# Patient Record
Sex: MALE | Race: WHITE | NOT HISPANIC OR LATINO | Employment: OTHER | URBAN - METROPOLITAN AREA
[De-identification: names, ages, dates, MRNs, and addresses within clinical notes are randomized per-mention and may not be internally consistent; named-entity substitution may affect disease eponyms.]

---

## 2017-01-30 DIAGNOSIS — E78.5 DYSLIPIDEMIA: ICD-10-CM

## 2017-01-31 RX ORDER — ATORVASTATIN CALCIUM 40 MG/1
TABLET, FILM COATED ORAL
Qty: 90 TABLET | Refills: 2 | Status: SHIPPED | OUTPATIENT
Start: 2017-01-31 | End: 2017-10-31 | Stop reason: SDUPTHER

## 2017-06-26 ENCOUNTER — OFFICE VISIT (OUTPATIENT)
Dept: FAMILY MEDICINE | Facility: CLINIC | Age: 52
End: 2017-06-26
Payer: COMMERCIAL

## 2017-06-26 VITALS
BODY MASS INDEX: 31.03 KG/M2 | HEIGHT: 71 IN | SYSTOLIC BLOOD PRESSURE: 120 MMHG | HEART RATE: 72 BPM | WEIGHT: 221.63 LBS | DIASTOLIC BLOOD PRESSURE: 72 MMHG | RESPIRATION RATE: 16 BRPM

## 2017-06-26 DIAGNOSIS — E78.5 DYSLIPIDEMIA: Primary | ICD-10-CM

## 2017-06-26 DIAGNOSIS — R00.2 HEART PALPITATIONS: ICD-10-CM

## 2017-06-26 PROCEDURE — 99213 OFFICE O/P EST LOW 20 MIN: CPT | Mod: S$GLB,,, | Performed by: FAMILY MEDICINE

## 2017-06-26 PROCEDURE — 99999 PR PBB SHADOW E&M-EST. PATIENT-LVL II: CPT | Mod: PBBFAC,,, | Performed by: FAMILY MEDICINE

## 2017-06-26 NOTE — PROGRESS NOTES
Subjective:       Patient ID: Daniel Vogel Jr. is a 52 y.o. male.    Chief Complaint: Follow-up (heart palpation)    Patient was sent to the emergency room from work because he is having a sensation of palpitations.  EKG complete lab work was normal.  He takes Lipitor and Fish all for mild hyperlipidemia.  He tolerates this well.  He sees cardiology on a regular basis.  3 years ago his calcium score was 0.  He's had a stress test done which was normal.  They did a Holter monitor on him and it came back normal.  He feels well and has not had any symptoms since going to the emergency room.      Review of Systems   Constitutional: Negative for activity change, chills, fatigue, fever and unexpected weight change.   HENT: Negative for hearing loss, rhinorrhea, sore throat and trouble swallowing.    Eyes: Positive for visual disturbance. Negative for discharge.   Respiratory: Positive for chest tightness. Negative for cough, shortness of breath and wheezing.    Cardiovascular: Negative for chest pain, palpitations and leg swelling.   Gastrointestinal: Negative for abdominal pain, blood in stool, constipation, diarrhea and vomiting.   Endocrine: Negative for cold intolerance, heat intolerance, polydipsia and polyuria.   Genitourinary: Negative for difficulty urinating, hematuria and urgency.   Musculoskeletal: Positive for arthralgias. Negative for back pain, joint swelling and neck pain.   Skin: Negative for rash.   Neurological: Negative for weakness, numbness and headaches.   Hematological: Negative for adenopathy.   Psychiatric/Behavioral: Negative for confusion, decreased concentration and dysphoric mood.       Objective:      Vitals:    06/26/17 1554   BP: 120/72   Pulse: 72   Resp: 16     Physical Exam   Constitutional: He appears well-developed and well-nourished.   HENT:   Head: Normocephalic and atraumatic.   Eyes: EOM are normal. Pupils are equal, round, and reactive to light.   Neck: Neck supple. No JVD  present. No thyromegaly present.   Cardiovascular: Normal rate, regular rhythm, normal heart sounds and intact distal pulses.  Exam reveals no gallop and no friction rub.    No murmur heard.  Pulmonary/Chest: Effort normal and breath sounds normal. He exhibits no tenderness.   Musculoskeletal: He exhibits tenderness. He exhibits no edema.   Mild shoulder pain   Neurological: He is alert. He has normal reflexes.   Psychiatric: He has a normal mood and affect. His behavior is normal. Judgment and thought content normal.       Assessment:       1. Dyslipidemia    2. Heart palpitations        Plan:   Daniel was seen today for follow-up.    Diagnoses and all orders for this visit:    Dyslipidemia  Continue taking fish oil and Lipitor    Heart palpitations  Doubt patient has any cardiac disease.  I think it is safe for him to return to work.  Keep appointment with cardiology  Return to clinic as needed

## 2017-10-31 DIAGNOSIS — E78.5 DYSLIPIDEMIA: ICD-10-CM

## 2017-10-31 RX ORDER — ATORVASTATIN CALCIUM 40 MG/1
TABLET, FILM COATED ORAL
Qty: 90 TABLET | Refills: 2 | Status: SHIPPED | OUTPATIENT
Start: 2017-10-31 | End: 2018-07-28 | Stop reason: SDUPTHER

## 2017-12-18 ENCOUNTER — OFFICE VISIT (OUTPATIENT)
Dept: URGENT CARE | Facility: CLINIC | Age: 52
End: 2017-12-18
Payer: COMMERCIAL

## 2017-12-18 VITALS
BODY MASS INDEX: 30.1 KG/M2 | HEIGHT: 71 IN | TEMPERATURE: 98 F | HEART RATE: 67 BPM | SYSTOLIC BLOOD PRESSURE: 134 MMHG | WEIGHT: 215 LBS | OXYGEN SATURATION: 98 % | DIASTOLIC BLOOD PRESSURE: 90 MMHG | RESPIRATION RATE: 16 BRPM

## 2017-12-18 DIAGNOSIS — M62.838 TRAPEZIUS MUSCLE SPASM: Primary | ICD-10-CM

## 2017-12-18 PROCEDURE — 99203 OFFICE O/P NEW LOW 30 MIN: CPT | Mod: S$GLB,,, | Performed by: PHYSICIAN ASSISTANT

## 2017-12-18 RX ORDER — METHOCARBAMOL 500 MG/1
500 TABLET, FILM COATED ORAL 4 TIMES DAILY
Qty: 30 TABLET | Refills: 0 | Status: SHIPPED | OUTPATIENT
Start: 2017-12-18 | End: 2017-12-28

## 2017-12-18 RX ORDER — FLUTICASONE PROPIONATE 50 MCG
SPRAY, SUSPENSION (ML) NASAL
COMMUNITY
Start: 2017-10-31 | End: 2021-04-15

## 2017-12-18 NOTE — LETTER
December 18, 2017      [facility]  [address] [address], [address]  [place] [address]  Phone: [phone]  Fax: [phone]       Patient: Daniel Vogel   YOB: 1965  Date of Visit: 12/18/2017    To Whom It May Concern:    Leroy Vogel  was at [facility] on 12/18/2017. He may return to work/school on 12/20/2017 with restrictions. Must refrain from lifting over 30 pounds for 1 week. May perform all other activities. If you have any questions or concerns, or if I can be of further assistance, please do not hesitate to contact me.    Sincerely,    Wendie Manuel PA-C

## 2017-12-18 NOTE — PROGRESS NOTES
"Subjective:       Patient ID: Daniel Vogel Jr. is a 52 y.o. male.    Vitals:  height is 5' 11" (1.803 m) and weight is 97.5 kg (215 lb). His oral temperature is 97.9 °F (36.6 °C). His blood pressure is 134/90 (abnormal) and his pulse is 67. His respiration is 16 and oxygen saturation is 98%.     Chief Complaint: Neck Pain    Patient states he started having pain after pulling the string to start a generator during the snow day.      Neck Pain    This is a new problem. The current episode started 1 to 4 weeks ago. The problem occurs constantly. The problem has been gradually worsening. The pain is associated with nothing. The pain is present in the midline. The quality of the pain is described as aching and shooting. The pain is at a severity of 8/10. The pain is moderate. The symptoms are aggravated by position. The pain is same all the time. Stiffness is present all day. Pertinent negatives include no chest pain, fever, headaches, leg pain, numbness, pain with swallowing, paresis, photophobia, syncope, tingling, trouble swallowing, visual change or weakness. He has tried NSAIDs for the symptoms. The treatment provided mild relief.     Review of Systems   Constitution: Negative for chills, fever and weakness.   HENT: Negative for sore throat and trouble swallowing.    Eyes: Negative for blurred vision and photophobia.   Cardiovascular: Negative for chest pain and syncope.   Respiratory: Negative for shortness of breath.    Skin: Negative for rash.   Musculoskeletal: Positive for joint pain and neck pain. Negative for back pain.   Gastrointestinal: Negative for abdominal pain, diarrhea, nausea and vomiting.   Neurological: Negative for headaches, numbness and tingling.   Psychiatric/Behavioral: The patient is not nervous/anxious.        Objective:      Physical Exam   Constitutional: He is oriented to person, place, and time. He appears well-developed and well-nourished. He is cooperative.  Non-toxic appearance. " He does not appear ill. No distress.   HENT:   Head: Normocephalic and atraumatic.   Right Ear: Hearing, tympanic membrane, external ear and ear canal normal.   Left Ear: Hearing, tympanic membrane, external ear and ear canal normal.   Nose: Nose normal. No mucosal edema, rhinorrhea or nasal deformity. No epistaxis. Right sinus exhibits no maxillary sinus tenderness and no frontal sinus tenderness. Left sinus exhibits no maxillary sinus tenderness and no frontal sinus tenderness.   Mouth/Throat: Uvula is midline, oropharynx is clear and moist and mucous membranes are normal. No trismus in the jaw. Normal dentition. No uvula swelling. No posterior oropharyngeal erythema.   Eyes: Conjunctivae and lids are normal. Right eye exhibits no discharge. Left eye exhibits no discharge. No scleral icterus.   Sclera clear bilat   Neck: Trachea normal, full passive range of motion without pain and phonation normal. Muscular tenderness present. No spinous process tenderness present. Neck rigidity present. No edema, no erythema and normal range of motion present. No Brudzinski's sign and no Kernig's sign noted.       No decreased ROM although mild discomfort elicited with Lateral flexion to the Left and flexion of the neck   Cardiovascular: Normal rate, regular rhythm, normal heart sounds, intact distal pulses and normal pulses.    Pulmonary/Chest: Effort normal and breath sounds normal. No respiratory distress.   Abdominal: Soft. Normal appearance and bowel sounds are normal. He exhibits no distension, no pulsatile midline mass and no mass. There is no tenderness.   Musculoskeletal: He exhibits no edema or deformity.        Right shoulder: He exhibits tenderness and spasm. He exhibits normal range of motion, no bony tenderness, no swelling, no effusion, no crepitus, no deformity, no laceration, normal pulse and normal strength.        Left shoulder: He exhibits normal range of motion, no tenderness, no bony tenderness, no  swelling, no effusion, no crepitus, no deformity, no laceration, no pain, no spasm, normal pulse and normal strength.        Right elbow: He exhibits normal range of motion, no swelling, no effusion, no deformity and no laceration. No tenderness found.        Right wrist: He exhibits normal range of motion, no tenderness, no bony tenderness, no swelling, no effusion, no crepitus, no deformity and no laceration.        Cervical back: He exhibits tenderness and spasm. He exhibits normal range of motion, no bony tenderness, no swelling, no edema, no deformity, no laceration and normal pulse.        Thoracic back: He exhibits normal range of motion, no tenderness, no bony tenderness, no swelling, no edema, no deformity, no laceration, no pain, no spasm and normal pulse.        Right upper arm: He exhibits no tenderness, no bony tenderness, no swelling, no edema, no deformity and no laceration.        Right forearm: He exhibits no tenderness, no bony tenderness, no swelling, no edema, no deformity and no laceration.        Arms:       Right hand: He exhibits normal range of motion, no tenderness, no bony tenderness, normal two-point discrimination, normal capillary refill, no deformity, no laceration and no swelling. Normal sensation noted. Normal strength noted.   R trapezius muscle spasm and TTP   Neurological: He is alert and oriented to person, place, and time. He has normal strength. No cranial nerve deficit or sensory deficit. He exhibits normal muscle tone. Coordination normal.   Skin: Skin is warm, dry and intact. He is not diaphoretic. No pallor.   Psychiatric: He has a normal mood and affect. His speech is normal and behavior is normal. Judgment and thought content normal. Cognition and memory are normal.   Nursing note and vitals reviewed.      Assessment:       1. Trapezius muscle spasm        Plan:         Trapezius muscle spasm  -     methocarbamol (ROBAXIN) 500 MG Tab; Take 1 tablet (500 mg total) by mouth  4 (four) times daily. As needed for muscle spasm. May cause drowsiness  Dispense: 30 tablet; Refill: 0        Neck Spasm     A spasm of the neck muscles can happen after a sudden awkward neck movement. Sleeping with your neck in a crooked position can also cause spasm. Some people respond to emotional stress by tensing the muscles of their neck, shoulders, and upper back. If neck spasm lasts long enough, it can cause headache.  The treatment described below will usually help the pain to go away in 5 to 7 days. Pain that continues may need further evaluation or other types of treatment such as physical therapy.  Home care  · Rest and relax the muscles. Use a comfortable pillow that supports the head and keeps the spine in a neutral position. The position of the head should not be tilted forward or backward. A rolled up towel may help for a custom fit.  · Some people find relief with heat. Heat can be applied with either a warm shower or bath or a moist towel heated in the microwave and massage. Others prefer cold packs. You can make an ice pack by filling a plastic bag that seals at the top with ice cubes or crushed ice and then wrapping it with a thin towel. Try both and use the method that feels best for 15 to 20 minutes, several times a day.  · Whether using ice or heat, be careful that you do not injure your skin. Never put ice directly on the skin. Always wrap the ice in a towel or other type of cloth. This is very important, especially in people with poor skin sensations.  · Try to reduce your stress level. Emotional stress can lead to neck muscle tension and get in the way of or delay the healing process.  · You may use over-the-counter pain medicine to control pain, unless another medicine was prescribed.If you have chronic liver or kidney disease or ever had a stomach ulcer or GI bleeding, talk with your healthcare provider before using these medicines.  Follow-up care  Follow up with your healthcare  provider if your symptoms do not show signs of improvement after one week. Physical therapy or further tests may be needed.  If X-rays, CT scans, or MRI scans were taken, you will be told of any new findings that may affect your care.  Call 911  Call 911 if you have:  · Sudden weakness or numbness in one or both arms  · Neck swelling, difficulty or painful swallowing  · Difficulty breathing  · Chest pain  When to seek medical advice  Call your healthcare provider right away if any of these occur:  · Pain becomes worse or spreads into one or both arms  · Increasing headache with nausea or vomiting  · Fever of 100.4°F (38°C) or above lasting for 24 to 48 hours  Date Last Reviewed: 11/21/2015  © 6119-7925 [employer]. [address] [place] [address], [place] [address]. All rights reserved. This information is not intended as a substitute for professional medical care. Always follow your healthcare professional's instructions.        Muscle Spasm  A muscle spasm is a sudden tightening of the muscle you cant control. This may be caused by strain, overworking the muscle, or injury. It can also be caused by dehydration, electrolyte imbalance, diabetes, alcohol use, and certain medicines. If it goes on long enough the muscle spasm causes pain. Common areas for muscle spasm are the legs, neck, and back.  Home care  · Heat, massage, and stretching will help relax muscle spasm.  · When the spasm is in your arm or leg, stretch the muscle passively. To do this, have someone bend or straighten the joint above or below the muscle until you feel the stretch on the sore muscle. You can stretch the muscle actively by moving the affected body part. This will stretch the muscle that is in spasm. For example, if the spasm is in your calf, bend the ankle so your toes point upward toward your knee. This will stretch your calf muscle.  · You may use over-the-counter pain medicine to control pain, unless another medicine was  prescribed. If you have chronic liver or kidney disease or ever had a stomach ulcer or GI bleeding, talk with your healthcare provider before using these medicines.  Follow-up care  Follow up with your healthcare provider, or as advised.    When to seek medical advice  Call your healthcare provider right away if any of the following occur:  · Fingers or toes become swollen, cold, blue, numb, or tingly  · You develop weakness in the affected arm or leg  · Pain increases and is not controlled by the above measures  Date Last Reviewed: 11/21/2015 © 2000-2017 [employer]. [address] [place] [address]. All rights reserved. This information is not intended as a substitute for professional medical care. Always follow your healthcare professional's instructions.      Please follow up with your Primary care provider within 2-5 days if your signs and symptoms have not resolved or worsen.     If your condition worsens or fails to improve we recommend that you receive another evaluation at the emergency room immediately or contact your primary medical clinic to discuss your concerns.   You must understand that you have received an Urgent Care treatment only and that you may be released before all of your medical problems are known or treated. You, the patient, will arrange for follow up care as instructed.

## 2017-12-18 NOTE — PATIENT INSTRUCTIONS
Neck Spasm     A spasm of the neck muscles can happen after a sudden awkward neck movement. Sleeping with your neck in a crooked position can also cause spasm. Some people respond to emotional stress by tensing the muscles of their neck, shoulders, and upper back. If neck spasm lasts long enough, it can cause headache.  The treatment described below will usually help the pain to go away in 5 to 7 days. Pain that continues may need further evaluation or other types of treatment such as physical therapy.  Home care  · Rest and relax the muscles. Use a comfortable pillow that supports the head and keeps the spine in a neutral position. The position of the head should not be tilted forward or backward. A rolled up towel may help for a custom fit.  · Some people find relief with heat. Heat can be applied with either a warm shower or bath or a moist towel heated in the microwave and massage. Others prefer cold packs. You can make an ice pack by filling a plastic bag that seals at the top with ice cubes or crushed ice and then wrapping it with a thin towel. Try both and use the method that feels best for 15 to 20 minutes, several times a day.  · Whether using ice or heat, be careful that you do not injure your skin. Never put ice directly on the skin. Always wrap the ice in a towel or other type of cloth. This is very important, especially in people with poor skin sensations.  · Try to reduce your stress level. Emotional stress can lead to neck muscle tension and get in the way of or delay the healing process.  · You may use over-the-counter pain medicine to control pain, unless another medicine was prescribed.If you have chronic liver or kidney disease or ever had a stomach ulcer or GI bleeding, talk with your healthcare provider before using these medicines.  Follow-up care  Follow up with your healthcare provider if your symptoms do not show signs of improvement after one week. Physical therapy or further tests may be  needed.  If X-rays, CT scans, or MRI scans were taken, you will be told of any new findings that may affect your care.  Call 911  Call 911 if you have:  · Sudden weakness or numbness in one or both arms  · Neck swelling, difficulty or painful swallowing  · Difficulty breathing  · Chest pain  When to seek medical advice  Call your healthcare provider right away if any of these occur:  · Pain becomes worse or spreads into one or both arms  · Increasing headache with nausea or vomiting  · Fever of 100.4°F (38°C) or above lasting for 24 to 48 hours  Date Last Reviewed: 11/21/2015  © 2145-2089 [employer]. [address] [place] [address], [place] [address]. All rights reserved. This information is not intended as a substitute for professional medical care. Always follow your healthcare professional's instructions.        Muscle Spasm  A muscle spasm is a sudden tightening of the muscle you cant control. This may be caused by strain, overworking the muscle, or injury. It can also be caused by dehydration, electrolyte imbalance, diabetes, alcohol use, and certain medicines. If it goes on long enough the muscle spasm causes pain. Common areas for muscle spasm are the legs, neck, and back.  Home care  · Heat, massage, and stretching will help relax muscle spasm.  · When the spasm is in your arm or leg, stretch the muscle passively. To do this, have someone bend or straighten the joint above or below the muscle until you feel the stretch on the sore muscle. You can stretch the muscle actively by moving the affected body part. This will stretch the muscle that is in spasm. For example, if the spasm is in your calf, bend the ankle so your toes point upward toward your knee. This will stretch your calf muscle.  · You may use over-the-counter pain medicine to control pain, unless another medicine was prescribed. If you have chronic liver or kidney disease or ever had a stomach ulcer or GI bleeding, talk with your healthcare  provider before using these medicines.  Follow-up care  Follow up with your healthcare provider, or as advised.    When to seek medical advice  Call your healthcare provider right away if any of the following occur:  · Fingers or toes become swollen, cold, blue, numb, or tingly  · You develop weakness in the affected arm or leg  · Pain increases and is not controlled by the above measures  Date Last Reviewed: 11/21/2015  © 2826-5795 [employer]. [address] [place] [address]. All rights reserved. This information is not intended as a substitute for professional medical care. Always follow your healthcare professional's instructions.      Please follow up with your Primary care provider within 2-5 days if your signs and symptoms have not resolved or worsen.     If your condition worsens or fails to improve we recommend that you receive another evaluation at the emergency room immediately or contact your primary medical clinic to discuss your concerns.   You must understand that you have received an Urgent Care treatment only and that you may be released before all of your medical problems are known or treated. You, the patient, will arrange for follow up care as instructed.

## 2017-12-27 ENCOUNTER — TELEPHONE (OUTPATIENT)
Dept: FAMILY MEDICINE | Facility: CLINIC | Age: 52
End: 2017-12-27

## 2017-12-27 ENCOUNTER — OFFICE VISIT (OUTPATIENT)
Dept: FAMILY MEDICINE | Facility: CLINIC | Age: 52
End: 2017-12-27
Payer: COMMERCIAL

## 2017-12-27 ENCOUNTER — HOSPITAL ENCOUNTER (OUTPATIENT)
Dept: RADIOLOGY | Facility: HOSPITAL | Age: 52
Discharge: HOME | End: 2017-12-27
Attending: FAMILY MEDICINE
Payer: COMMERCIAL

## 2017-12-27 VITALS
HEIGHT: 71 IN | HEART RATE: 80 BPM | BODY MASS INDEX: 31.36 KG/M2 | DIASTOLIC BLOOD PRESSURE: 70 MMHG | WEIGHT: 224 LBS | SYSTOLIC BLOOD PRESSURE: 116 MMHG

## 2017-12-27 DIAGNOSIS — S16.1XXD STRAIN OF NECK MUSCLE, SUBSEQUENT ENCOUNTER: Primary | ICD-10-CM

## 2017-12-27 DIAGNOSIS — S16.1XXD STRAIN OF NECK MUSCLE, SUBSEQUENT ENCOUNTER: ICD-10-CM

## 2017-12-27 PROCEDURE — 99999 PR PBB SHADOW E&M-EST. PATIENT-LVL III: CPT | Mod: PBBFAC,,, | Performed by: FAMILY MEDICINE

## 2017-12-27 PROCEDURE — 72040 X-RAY EXAM NECK SPINE 2-3 VW: CPT | Mod: 26,,, | Performed by: RADIOLOGY

## 2017-12-27 PROCEDURE — 99214 OFFICE O/P EST MOD 30 MIN: CPT | Mod: S$GLB,,, | Performed by: FAMILY MEDICINE

## 2017-12-27 PROCEDURE — 72040 X-RAY EXAM NECK SPINE 2-3 VW: CPT | Mod: TC

## 2017-12-27 RX ORDER — PREDNISONE 10 MG/1
TABLET ORAL
Qty: 30 TABLET | Refills: 0 | Status: SHIPPED | OUTPATIENT
Start: 2017-12-27 | End: 2018-10-17

## 2017-12-27 RX ORDER — MELOXICAM 15 MG/1
15 TABLET ORAL DAILY
Qty: 30 TABLET | Refills: 3 | Status: SHIPPED | OUTPATIENT
Start: 2017-12-27 | End: 2018-01-26

## 2017-12-27 NOTE — PROGRESS NOTES
SUBJECTIVE:   Daniel Vogel Jr. is a 52 y.o. male who complains of an injury causing neck pain 3 week(s) ago. The pain is positional with movement of neck without radiation of pain down the arms. Mechanism of injury: pulling on rope.  Symptoms have been acute and constant since that time. Prior history of neck problems: no prior neck problems. There is no numbness, tingling, weakness in the arms.    OBJECTIVE:  Vital signs as noted above. Patient appears to be in mild to moderate pain.   Neck exam: tenderness over lower cervical spine and nuchal area, tenderness over trapezial muscles, reduced painful C-spine range of motion, normal neurological exam of arms; normal DTR's, motor, sensory exam, negative Lhermitte's sign.  X-Ray: ordered, but results not yet available.    ASSESSMENT:   strain and cervical strain    PLAN:  X-Ray ordered, prescription for NSAID given, referral to Physical Therapy  Consider Physical Therapy and XRay studies if not improving.   Call or return to clinic prn if these symptoms worsen or fail to improve as anticipated.  Strain of neck muscle, subsequent encounter  -     Ambulatory Referral to Physical/Occupational Therapy  -     meloxicam (MOBIC) 15 MG tablet; Take 1 tablet (15 mg total) by mouth once daily.  Dispense: 30 tablet; Refill: 3  -     predniSONE (DELTASONE) 10 MG tablet; Take 4 po q day x 4 days, then 3 po q day x 3 days, then 2 po q day x 2 days, then 1 po x 1  Dispense: 30 tablet; Refill: 0  -     X-Ray Cervical Spine AP And Lateral; Future    RTC in one week

## 2017-12-28 NOTE — TELEPHONE ENCOUNTER
Referral, demographics, and clinical notes faxed to [facility] at [phone]. Pt notified of referral and number to facility. Will call to set up appt.

## 2018-01-03 ENCOUNTER — OFFICE VISIT (OUTPATIENT)
Dept: FAMILY MEDICINE | Facility: CLINIC | Age: 53
End: 2018-01-03
Payer: COMMERCIAL

## 2018-01-03 VITALS
RESPIRATION RATE: 20 BRPM | DIASTOLIC BLOOD PRESSURE: 76 MMHG | WEIGHT: 227.38 LBS | SYSTOLIC BLOOD PRESSURE: 110 MMHG | HEIGHT: 71 IN | HEART RATE: 80 BPM | BODY MASS INDEX: 31.83 KG/M2

## 2018-01-03 DIAGNOSIS — S16.1XXD STRAIN OF NECK MUSCLE, SUBSEQUENT ENCOUNTER: Primary | ICD-10-CM

## 2018-01-03 PROCEDURE — 99213 OFFICE O/P EST LOW 20 MIN: CPT | Mod: S$GLB,,, | Performed by: FAMILY MEDICINE

## 2018-01-03 PROCEDURE — 99999 PR PBB SHADOW E&M-EST. PATIENT-LVL III: CPT | Mod: PBBFAC,,, | Performed by: FAMILY MEDICINE

## 2018-01-03 NOTE — PROGRESS NOTES
SUBJECTIVE:   Daniel Vogel Jr. is a 52 y.o. male who was seen last week complaining of an injury causing neck pain 4 week(s) ago. The pain is positional with movement of neck without radiation of pain down the arms. Mechanism of injury: pulling on rope.  Symptoms have been acute and constant since that time. Prior history of neck problems: no prior neck problems. There is no numbness, tingling, weakness in the arms.  Placed on prednisone and mobic for pain.  Feeling a lot better.  He will start PT tomorrow.    OBJECTIVE:  Vital signs as noted above. Patient appears to be in mild to moderate pain.   Neck exam: tenderness over lower cervical spine and nuchal area, tenderness over trapezial muscles, reduced painful C-spine range of motion, normal neurological exam of arms; normal DTR's, motor, sensory exam, negative Lhermitte's sign.  X-Ray: last week reviewed with patient    ASSESSMENT:   strain and cervical strain    PLAN:  X-Ray ordered, prescription for NSAID given, referral to Physical Therapy  Go to Physical Therapy.  Call or return to clinic prn if these symptoms worsen or fail to improve as anticipated.  Strain of neck muscle, subsequent encounter    Go to PT  Return to regular duty.  Return to clinic if symptoms should worsen.

## 2018-01-04 ENCOUNTER — TELEPHONE (OUTPATIENT)
Dept: FAMILY MEDICINE | Facility: CLINIC | Age: 53
End: 2018-01-04

## 2018-01-07 ENCOUNTER — OFFICE VISIT (OUTPATIENT)
Dept: URGENT CARE | Facility: CLINIC | Age: 53
End: 2018-01-07
Payer: COMMERCIAL

## 2018-01-07 VITALS
TEMPERATURE: 98 F | DIASTOLIC BLOOD PRESSURE: 84 MMHG | RESPIRATION RATE: 18 BRPM | HEIGHT: 71 IN | SYSTOLIC BLOOD PRESSURE: 135 MMHG | HEART RATE: 86 BPM | OXYGEN SATURATION: 96 % | WEIGHT: 220 LBS | BODY MASS INDEX: 30.8 KG/M2

## 2018-01-07 DIAGNOSIS — R05.9 COUGH: ICD-10-CM

## 2018-01-07 DIAGNOSIS — J10.1 INFLUENZA A WITH RESPIRATORY MANIFESTATIONS: Primary | ICD-10-CM

## 2018-01-07 PROBLEM — B34.9 ACUTE VIRAL SYNDROME: Status: ACTIVE | Noted: 2018-01-07

## 2018-01-07 LAB
CTP QC/QA: YES
FLUAV AG NPH QL: POSITIVE
FLUBV AG NPH QL: NEGATIVE

## 2018-01-07 PROCEDURE — 99214 OFFICE O/P EST MOD 30 MIN: CPT | Mod: S$GLB,,, | Performed by: EMERGENCY MEDICINE

## 2018-01-07 PROCEDURE — 87804 INFLUENZA ASSAY W/OPTIC: CPT | Mod: QW,S$GLB,, | Performed by: EMERGENCY MEDICINE

## 2018-01-07 RX ORDER — CODEINE PHOSPHATE AND GUAIFENESIN 10; 100 MG/5ML; MG/5ML
5-10 SOLUTION ORAL 3 TIMES DAILY PRN
Qty: 240 ML | Refills: 0 | Status: SHIPPED | OUTPATIENT
Start: 2018-01-07 | End: 2018-01-12

## 2018-01-07 RX ORDER — ONDANSETRON 8 MG/1
8 TABLET, ORALLY DISINTEGRATING ORAL EVERY 6 HOURS PRN
Qty: 12 TABLET | Refills: 0 | Status: SHIPPED | OUTPATIENT
Start: 2018-01-07 | End: 2018-01-10

## 2018-01-07 RX ORDER — OSELTAMIVIR PHOSPHATE 75 MG/1
75 CAPSULE ORAL 2 TIMES DAILY
Qty: 10 CAPSULE | Refills: 0 | Status: SHIPPED | OUTPATIENT
Start: 2018-01-07 | End: 2018-01-12

## 2018-01-07 NOTE — LETTER
January 7, 2018      [facility]  [address], [address]  [place] [address]  Phone: [phone]  Fax: [phone]       Patient: Daniel Vogel   YOB: 1965  Date of Visit: 01/07/2018    To Whom It May Concern:    Leroy Vogel  was at [facility] on 01/07/2018. He may return to work/school on 1/12/18, earlier if feels better and no fever for 24 hours with no restrictions. If you have any questions or concerns, or if I can be of further assistance, please do not hesitate to contact me.    Sincerely,            Travis Rodriges MD

## 2018-01-07 NOTE — PATIENT INSTRUCTIONS
Travis Rodriges MD  Go to the Emergency Department for any problems  Call your PCP for follow up next available.    Influenza (Adult)    Influenza is also called the flu. It is a viral illness that affects the air passages of your lungs. It is different from the common cold. The flu can easily be passed from one to person to another. It may be spread through the air by coughing and sneezing. Or it can be spread by touching the sick person and then touching your own eyes, nose, or mouth.  The flu starts 1 to 3 days after you are exposed to the flu virus. It may last for 1 to 2 weeks but many people feel tired or fatigued for many weeks afterward. You usually dont need to take antibiotics unless you have a complication. This might be an ear or sinus infection or pneumonia.  Symptoms of the flu may be mild or severe. They can include extreme tiredness (wanting to stay in bed all day), chills, fevers, muscle aches, soreness with eye movement, headache, and a dry, hacking cough.  Home care  Follow these guidelines when caring for yourself at home:  · Avoid being around cigarette smoke, whether yours or other peoples.  · Acetaminophen or ibuprofen will help ease your fever, muscle aches, and headache. Dont give aspirin to anyone younger than 18 who has the flu. Aspirin can harm the liver.  · Nausea and loss of appetite are common with the flu. Eat light meals. Drink 6 to 8 glasses of liquids every day. Good choices are water, sport drinks, soft drinks without caffeine, juices, tea, and soup. Extra fluids will also help loosen secretions in your nose and lungs.  · Over-the-counter cold medicines will not make the flu go away faster. But the medicines may help with coughing, sore throat, and congestion in your nose and sinuses. Dont use a decongestant if you have high blood pressure.  · Stay home until your fever has been gone for at least 24 hours without using medicine to reduce fever.  Follow-up care  Follow up with  your healthcare provider, or as advised, if you are not getting better over the next week.  If you are age 65 or older, talk with your provider about getting a pneumococcal vaccine every 5 years. You should also get this vaccine if you have chronic asthma or COPD. All adults should get a flu vaccine every fall. Ask your provider about this.  When to seek medical advice  Call your healthcare provider right away if any of these occur:  · Cough with lots of colored mucus (sputum) or blood in your mucus  · Chest pain, shortness of breath, wheezing, or trouble breathing  · Severe headache, or face, neck, or ear pain  · New rash with fever  · Fever of 100.4°F (38°C) or higher, or as directed by your healthcare provider  · Confusion, behavior change, or seizure  · Severe weakness or dizziness  · You get a new fever or cough after getting better for a few days  Date Last Reviewed: 1/1/2017  © 6924-5149 [employer]. [address] [place] [address], [place] [address]. All rights reserved. This information is not intended as a substitute for professional medical care. Always follow your healthcare professional's instructions.

## 2018-01-07 NOTE — PROGRESS NOTES
"Subjective:       Patient ID: Daniel Vogel Jr. is a 52 y.o. male.    Vitals:  height is 5' 11" (1.803 m) and weight is 99.8 kg (220 lb). His oral temperature is 98.2 °F (36.8 °C). His blood pressure is 135/84 and his pulse is 86. His respiration is 18 and oxygen saturation is 96%.     Chief Complaint: Sinus Problem    Started feeling bad 1-1/2 d ago with cough/body aches/runny nose causing sore throat.      Sinus Problem   The current episode started in the past 7 days (Thursday). The problem has been gradually worsening since onset. There has been no fever. His pain is at a severity of 8/10. The pain is severe. Associated symptoms include congestion, coughing, headaches, sinus pressure and a sore throat. Pertinent negatives include no chills, ear pain, hoarse voice or shortness of breath. Past treatments include oral decongestants. The treatment provided moderate relief.     Review of Systems   Constitution: Negative for chills, fever and malaise/fatigue.   HENT: Positive for congestion, sinus pressure and sore throat. Negative for ear pain and hoarse voice.    Eyes: Negative for discharge and redness.   Cardiovascular: Negative for chest pain, dyspnea on exertion and leg swelling.   Respiratory: Positive for cough. Negative for shortness of breath, sputum production and wheezing.    Musculoskeletal: Negative for myalgias.   Gastrointestinal: Negative for abdominal pain and nausea.   Neurological: Positive for headaches.       Objective:      Physical Exam   Constitutional: He is oriented to person, place, and time. He appears well-developed and well-nourished.   Occas nonprod cough   HENT:   Head: Normocephalic and atraumatic.   Right Ear: Tympanic membrane, external ear and ear canal normal.   Left Ear: Tympanic membrane, external ear and ear canal normal.   Nose: Mucosal edema and rhinorrhea present. Right sinus exhibits no maxillary sinus tenderness and no frontal sinus tenderness. Left sinus exhibits no " maxillary sinus tenderness and no frontal sinus tenderness.   Mouth/Throat: Uvula is midline and mucous membranes are normal. Posterior oropharyngeal erythema present. No oropharyngeal exudate.   Eyes: EOM are normal. Pupils are equal, round, and reactive to light.   Neck: Normal range of motion.   Cardiovascular: Normal rate, regular rhythm and normal heart sounds.    Pulmonary/Chest: Breath sounds normal.   Musculoskeletal: Normal range of motion.   Lymphadenopathy:     He has no cervical adenopathy.   Neurological: He is alert and oriented to person, place, and time.   Skin: Skin is warm and dry.   Psychiatric: He has a normal mood and affect. His behavior is normal.       Assessment:       1. Influenza A with respiratory manifestations    2. Cough        Plan:         Influenza A with respiratory manifestations  -     POCT Influenza A/B  -     ondansetron (ZOFRAN-ODT) 8 MG TbDL; Take 1 tablet (8 mg total) by mouth every 6 (six) hours as needed (Prn NAUSEA).  Dispense: 12 tablet; Refill: 0  -     oseltamivir (TAMIFLU) 75 MG capsule; Take 1 capsule (75 mg total) by mouth 2 (two) times daily.  Dispense: 10 capsule; Refill: 0    Cough  -     guaifenesin-codeine 100-10 mg/5 ml (TUSSI-ORGANIDIN NR)  mg/5 mL syrup; Take 5-10 mLs by mouth 3 (three) times daily as needed for Cough.  Dispense: 240 mL; Refill: 0      Travis Rodriges MD  Go to the Emergency Department for any problems  Call your PCP for follow up next available.

## 2018-01-10 ENCOUNTER — TELEPHONE (OUTPATIENT)
Dept: URGENT CARE | Facility: CLINIC | Age: 53
End: 2018-01-10

## 2018-03-13 DIAGNOSIS — E78.5 DYSLIPIDEMIA: ICD-10-CM

## 2018-03-14 RX ORDER — OMEGA-3-ACID ETHYL ESTERS 1 G/1
CAPSULE, LIQUID FILLED ORAL
Qty: 180 CAPSULE | Refills: 3 | Status: SHIPPED | OUTPATIENT
Start: 2018-03-14 | End: 2021-04-14

## 2018-06-18 DIAGNOSIS — Z12.11 COLON CANCER SCREENING: ICD-10-CM

## 2018-07-28 DIAGNOSIS — E78.5 DYSLIPIDEMIA: ICD-10-CM

## 2018-07-30 RX ORDER — ATORVASTATIN CALCIUM 40 MG/1
TABLET, FILM COATED ORAL
Qty: 90 TABLET | Refills: 2 | Status: SHIPPED | OUTPATIENT
Start: 2018-07-30 | End: 2021-10-20 | Stop reason: SDUPTHER

## 2018-08-10 DIAGNOSIS — Z11.59 NEED FOR HEPATITIS C SCREENING TEST: ICD-10-CM

## 2018-10-17 ENCOUNTER — OFFICE VISIT (OUTPATIENT)
Dept: FAMILY MEDICINE | Facility: CLINIC | Age: 53
End: 2018-10-17
Payer: COMMERCIAL

## 2018-10-17 ENCOUNTER — PATIENT MESSAGE (OUTPATIENT)
Dept: FAMILY MEDICINE | Facility: CLINIC | Age: 53
End: 2018-10-17

## 2018-10-17 VITALS
BODY MASS INDEX: 31.13 KG/M2 | WEIGHT: 222.38 LBS | HEART RATE: 76 BPM | SYSTOLIC BLOOD PRESSURE: 118 MMHG | DIASTOLIC BLOOD PRESSURE: 72 MMHG | RESPIRATION RATE: 18 BRPM | HEIGHT: 71 IN

## 2018-10-17 DIAGNOSIS — J30.1 SEASONAL ALLERGIC RHINITIS DUE TO POLLEN: ICD-10-CM

## 2018-10-17 DIAGNOSIS — E78.5 DYSLIPIDEMIA: ICD-10-CM

## 2018-10-17 DIAGNOSIS — R97.20 ELEVATED PSA: ICD-10-CM

## 2018-10-17 DIAGNOSIS — N40.0 BENIGN PROSTATIC HYPERPLASIA WITHOUT LOWER URINARY TRACT SYMPTOMS: ICD-10-CM

## 2018-10-17 DIAGNOSIS — L40.9 PSORIASIS: ICD-10-CM

## 2018-10-17 DIAGNOSIS — Z12.11 SCREENING FOR COLON CANCER: ICD-10-CM

## 2018-10-17 DIAGNOSIS — Z00.00 PREVENTATIVE HEALTH CARE: Primary | ICD-10-CM

## 2018-10-17 PROBLEM — B34.9 ACUTE VIRAL SYNDROME: Status: RESOLVED | Noted: 2018-01-07 | Resolved: 2018-10-17

## 2018-10-17 PROBLEM — R05.9 COUGH: Status: RESOLVED | Noted: 2018-01-07 | Resolved: 2018-10-17

## 2018-10-17 LAB
25(OH)D3+25(OH)D2 SERPL-MCNC: 24 NG/ML
BASOPHILS # BLD AUTO: 0.05 K/UL
BASOPHILS NFR BLD: 1 %
CHOLEST SERPL-MCNC: 135 MG/DL
CHOLEST/HDLC SERPL: 4 {RATIO}
COMPLEXED PSA SERPL-MCNC: 0.56 NG/ML
DIFFERENTIAL METHOD: ABNORMAL
EOSINOPHIL # BLD AUTO: 0.2 K/UL
EOSINOPHIL NFR BLD: 3.8 %
ERYTHROCYTE [DISTWIDTH] IN BLOOD BY AUTOMATED COUNT: 13.1 %
HCT VFR BLD AUTO: 44.5 %
HDLC SERPL-MCNC: 34 MG/DL
HDLC SERPL: 25.2 %
HGB BLD-MCNC: 15.2 G/DL
LDLC SERPL CALC-MCNC: 65.6 MG/DL
LYMPHOCYTES # BLD AUTO: 1.4 K/UL
LYMPHOCYTES NFR BLD: 27.1 %
MCH RBC QN AUTO: 32.8 PG
MCHC RBC AUTO-ENTMCNC: 34.2 G/DL
MCV RBC AUTO: 96 FL
MONOCYTES # BLD AUTO: 0.5 K/UL
MONOCYTES NFR BLD: 10.8 %
NEUTROPHILS # BLD AUTO: 2.9 K/UL
NEUTROPHILS NFR BLD: 57.3 %
NONHDLC SERPL-MCNC: 101 MG/DL
PLATELET # BLD AUTO: 176 K/UL
PMV BLD AUTO: 10.4 FL
RBC # BLD AUTO: 4.64 M/UL
TRIGL SERPL-MCNC: 177 MG/DL
WBC # BLD AUTO: 5.01 K/UL

## 2018-10-17 PROCEDURE — 99214 OFFICE O/P EST MOD 30 MIN: CPT | Mod: 25,S$GLB,, | Performed by: FAMILY MEDICINE

## 2018-10-17 PROCEDURE — 82306 VITAMIN D 25 HYDROXY: CPT

## 2018-10-17 PROCEDURE — 99999 PR PBB SHADOW E&M-EST. PATIENT-LVL III: CPT | Mod: PBBFAC,,, | Performed by: FAMILY MEDICINE

## 2018-10-17 PROCEDURE — 90686 IIV4 VACC NO PRSV 0.5 ML IM: CPT | Mod: S$GLB,,, | Performed by: FAMILY MEDICINE

## 2018-10-17 PROCEDURE — 80061 LIPID PANEL: CPT

## 2018-10-17 PROCEDURE — 3008F BODY MASS INDEX DOCD: CPT | Mod: CPTII,S$GLB,, | Performed by: FAMILY MEDICINE

## 2018-10-17 PROCEDURE — 85025 COMPLETE CBC W/AUTO DIFF WBC: CPT

## 2018-10-17 PROCEDURE — 84153 ASSAY OF PSA TOTAL: CPT

## 2018-10-17 PROCEDURE — 90471 IMMUNIZATION ADMIN: CPT | Mod: S$GLB,,, | Performed by: FAMILY MEDICINE

## 2018-10-17 PROCEDURE — 36415 COLL VENOUS BLD VENIPUNCTURE: CPT | Mod: S$GLB,,, | Performed by: FAMILY MEDICINE

## 2018-10-17 RX ORDER — AZELASTINE 1 MG/ML
1-2 SPRAY, METERED NASAL 2 TIMES DAILY
Qty: 30 ML | Refills: 5 | Status: SHIPPED | OUTPATIENT
Start: 2018-10-17 | End: 2021-04-15

## 2018-10-17 RX ORDER — CLOBETASOL PROPIONATE 0.5 MG/G
CREAM TOPICAL 2 TIMES DAILY
Qty: 60 G | Refills: 5 | Status: SHIPPED | OUTPATIENT
Start: 2018-10-17 | End: 2021-04-15

## 2018-10-17 NOTE — PROGRESS NOTES
Subjective:       Patient ID: Daniel Vogel Jr. is a 53 y.o. male.    Chief Complaint: Annual Exam (physical)    Here for check up.  He would like to get a colonoscopy.  Patient is taking His statin every day for hyperlipidemia.  She is feeling well on the medication.  He denies side effects such as myalgias.  Patient complains of sinus congestion, bilateral ear pain, sore throat.  He's also having stuffy nose, mild cough.  Denies fever.  He has a history of elevated PSA.  He has had several prostate biopsies.  He has no prostate symptoms.  Prostate cancer has been ruled out.      Review of Systems   Constitutional: Negative for activity change and unexpected weight change.   HENT: Negative for hearing loss, rhinorrhea and trouble swallowing.    Eyes: Negative for discharge and visual disturbance.   Respiratory: Negative for chest tightness and wheezing.    Cardiovascular: Negative for chest pain and palpitations.   Gastrointestinal: Negative for blood in stool, constipation, diarrhea and vomiting.   Endocrine: Negative for polydipsia and polyuria.   Genitourinary: Negative for difficulty urinating, hematuria and urgency.   Musculoskeletal: Negative for arthralgias, joint swelling and neck pain.   Skin: Positive for rash.   Neurological: Negative for weakness and headaches.   Psychiatric/Behavioral: Negative for confusion and dysphoric mood.       Objective:      Vitals:    10/17/18 0945   BP: 118/72   Pulse: 76   Resp: 18     Physical Exam   Constitutional: He is oriented to person, place, and time. He appears well-developed and well-nourished.   HENT:   Head: Normocephalic and atraumatic.   Right Ear: Tympanic membrane, external ear and ear canal normal.   Left Ear: Tympanic membrane, external ear and ear canal normal.   Nose: Nose normal.   Mouth/Throat: Oropharynx is clear and moist.   Eyes: Conjunctivae and EOM are normal. Pupils are equal, round, and reactive to light.   Neck: Normal range of motion. Neck  supple. No tracheal deviation present. No thyromegaly present.   Cardiovascular: Normal rate, regular rhythm, normal heart sounds and intact distal pulses. Exam reveals no gallop and no friction rub.   No murmur heard.  Pulmonary/Chest: Effort normal and breath sounds normal.   Abdominal: Soft. Bowel sounds are normal. He exhibits no distension and no mass. There is no tenderness. There is no rebound and no guarding. No hernia. Hernia confirmed negative in the right inguinal area and confirmed negative in the left inguinal area.   Musculoskeletal: Normal range of motion. He exhibits no edema.   Neurological: He is alert and oriented to person, place, and time. He has normal reflexes. No cranial nerve deficit.   Skin: Skin is warm and dry. Rash noted.   Try psoriatic rash on both hands   Psychiatric: He has a normal mood and affect. His behavior is normal. Judgment and thought content normal.       Assessment:       1. Preventative health care    2. Dyslipidemia    3. Benign prostatic hyperplasia without lower urinary tract symptoms    4. Elevated PSA    5. Seasonal allergic rhinitis due to pollen    6. Psoriasis        Plan:   Daniel was seen today for annual exam.    Diagnoses and all orders for this visit:    Preventative health care  -     CBC auto differential; Future  -     PSA, Screening; Future  -     Vitamin D; Future    Dyslipidemia  Continue Lipitor and Lovaza  -     Comprehensive metabolic panel; Future  -     Lipid panel; Future    Benign prostatic hyperplasia without lower urinary tract symptoms  -     PSA, Screening; Future    Elevated PSA    Seasonal allergic rhinitis due to pollen  -     azelastine (ASTELIN) 137 mcg (0.1 %) nasal spray; 1-2 sprays (137-274 mcg total) by Nasal route 2 (two) times daily.  Dispense: 30 mL; Refill: 5    Psoriasis  -     clobetasol (TEMOVATE) 0.05 % cream; Apply topically 2 (two) times daily. for 10 days  Dispense: 60 g; Refill: 5    Other orders  -     Influenza -  Quadrivalent (3 years & older) (PF)    schedule colonoscopy    RTC 6 months

## 2018-10-29 ENCOUNTER — ANESTHESIA EVENT (OUTPATIENT)
Dept: ENDOSCOPY | Facility: HOSPITAL | Age: 53
End: 2018-10-29
Payer: COMMERCIAL

## 2018-10-30 ENCOUNTER — HOSPITAL ENCOUNTER (OUTPATIENT)
Dept: PREADMISSION TESTING | Facility: HOSPITAL | Age: 53
Discharge: HOME | End: 2018-10-30
Attending: FAMILY MEDICINE
Payer: COMMERCIAL

## 2018-10-30 VITALS — BODY MASS INDEX: 31.14 KG/M2 | HEIGHT: 71 IN | WEIGHT: 222.44 LBS

## 2018-10-30 NOTE — DISCHARGE INSTRUCTIONS
Colonoscopy Outpatient procedure instructions    Prep Review  Nothing to eat or drink after midnight unless your doctor tells you differently. Dr. Cleaning patients have nothing to eat or drink after morning prep is complete      Take medications as instructed by your doctor.    Wear something comfortable that is easy for you to take off and put on.   Do not wear any makeup, jewelry, or body piercings. Leave valuables at home or let your family member keep them for you. Do not bring them to the Surgery area.     Date/Day of Procedure: Tuesday 11-13-18  Arrival time: 10am    Arrival time: Someone will call you the workday day before the procedure           between 1pm and 5pm to give you an arrival time   If asked to report to the hospital before 7:00 am report to the Emergency Room.  If asked to report to the hospital at 7:00 a.m. or later report to Patient Registration  It is not necessary to report earlier than the time you are told.   Ignore any automated/computer generated calls telling to what time to report to the hospital.   Plan to be at the hospital for about 4 hours, however, it could be longer.

## 2018-10-30 NOTE — ANESTHESIA PREPROCEDURE EVALUATION
10/30/2018     Daniel Vogel Jr. is a 53 y.o., male is scheduled for extracoporeal shock wave lithotripsy under MAC/gen on 9/21/2016.    Past Surgical History   Procedure Laterality Date    Colon surgery       fissure    Prostate surgery biopsy in office  2014     bx    Elbow surgery left 2006    Shoulder arthroscopy right 2014    Nasal septum surgery  2001    Uvulectomy         Pre-op Assessment    I have reviewed the Patient Summary Reports.     I have reviewed the Nursing Notes.   I have reviewed the Medications.     Review of Systems  Anesthesia Hx:  No problems with previous Anesthesia  History of prior surgery of interest to airway management or planning: Previous anesthesia: General  Denies Personal Hx of Anesthesia complications.   Social:  Non-Smoker, Alcohol Use    Hematology/Oncology:  Hematology Normal        EENT/Dental:EENT/Dental Normal   Cardiovascular:   Exercise tolerance: good Denies Dysrhythmias.   Denies Angina. hyperlipidemia    Pulmonary:  Pulmonary Normal    Renal/:   renal calculi Denies hematuria   Hepatic/GI:  Hepatic/GI Normal    Neurological:  Neurology Normal    Endocrine:  Endocrine Normal          Physical Exam  General:  Obesity    Airway/Jaw/Neck:  Airway Findings: Mouth Opening: Normal Tongue: Normal  General Airway Assessment: Adult  Oropharynx Findings: (uvulectomy)  Mallampati: II  TM Distance: Normal, at least 6 cm  Jaw/Neck Findings:  Neck ROM: Normal ROM  Neck Findings: Normal    Eyes/Ears/Nose:  EYES/EARS/NOSE FINDINGS: Normal   Dental:  Dental Findings: Periodontal disease, Mild    Chest/Lungs:  Chest/Lungs Clear    Heart/Vascular:  Heart Findings: Normal Heart murmur: negative    Abdomen:  Abdomen Findings: Normal      Mental Status:  Mental Status Findings: Normal        Stress Echo 2013  CONCLUSIONS     1 - Normal left ventricular function (EF 60%).      2 - Normal diastolic function.   No evidence of stress induced myocardial ischemia.       Anesthesia Plan  Type of Anesthesia, risks & benefits discussed:  Anesthesia Type:  general, MAC  Patient's Preference:   Intra-op Monitoring Plan:   Intra-op Monitoring Plan Comments:   Post Op Pain Control Plan:   Post Op Pain Control Plan Comments:   Induction:   IV  Beta Blocker:  Patient is not currently on a Beta-Blocker (No further documentation required).       Informed Consent: Patient understands risks and agrees with Anesthesia plan.  Questions answered.   ASA Score: 2     Day of Surgery Review of History & Physical: I have interviewed and examined the patient. I have reviewed the patient's H&P dated:  There are no significant changes.  H&P update referred to the surgeon.         Ready For Surgery From Anesthesia Perspective.

## 2018-10-30 NOTE — LETTER
October 30, 2018      [facility] [address] [place] [address]  Phone: [phone]  Fax: [phone]       Patient: Daniel Vogel   YOB: 1965  Date of Visit: 10/30/2018    To Whom It May Concern:    Leroy Vogel  was at [facility] on 10/30/2018. He may return to work on 10-31-18 with no restrictions. If you have any questions or concerns, or if I can be of further assistance, please do not hesitate to contact me.    Sincerely,    Araceli Torres RN

## 2018-11-13 ENCOUNTER — TELEPHONE (OUTPATIENT)
Dept: FAMILY MEDICINE | Facility: CLINIC | Age: 53
End: 2018-11-13

## 2018-11-13 ENCOUNTER — ANESTHESIA (OUTPATIENT)
Dept: ENDOSCOPY | Facility: HOSPITAL | Age: 53
End: 2018-11-13
Payer: COMMERCIAL

## 2018-11-13 ENCOUNTER — HOSPITAL ENCOUNTER (OUTPATIENT)
Facility: HOSPITAL | Age: 53
Discharge: HOME | End: 2018-11-13
Attending: FAMILY MEDICINE | Admitting: FAMILY MEDICINE
Payer: COMMERCIAL

## 2018-11-13 VITALS
DIASTOLIC BLOOD PRESSURE: 76 MMHG | OXYGEN SATURATION: 96 % | RESPIRATION RATE: 16 BRPM | TEMPERATURE: 97 F | HEART RATE: 68 BPM | SYSTOLIC BLOOD PRESSURE: 138 MMHG

## 2018-11-13 DIAGNOSIS — K57.30 DIVERTICULOSIS OF LARGE INTESTINE WITHOUT HEMORRHAGE: ICD-10-CM

## 2018-11-13 DIAGNOSIS — Z12.11 COLON CANCER SCREENING: ICD-10-CM

## 2018-11-13 PROCEDURE — 37000009 HC ANESTHESIA EA ADD 15 MINS: Performed by: FAMILY MEDICINE

## 2018-11-13 PROCEDURE — 63600175 PHARM REV CODE 636 W HCPCS: Performed by: NURSE ANESTHETIST, CERTIFIED REGISTERED

## 2018-11-13 PROCEDURE — 25000003 PHARM REV CODE 250: Performed by: FAMILY MEDICINE

## 2018-11-13 PROCEDURE — 45378 DIAGNOSTIC COLONOSCOPY: CPT | Mod: ,,, | Performed by: FAMILY MEDICINE

## 2018-11-13 PROCEDURE — G0121 COLON CA SCRN NOT HI RSK IND: HCPCS | Performed by: FAMILY MEDICINE

## 2018-11-13 PROCEDURE — 37000008 HC ANESTHESIA 1ST 15 MINUTES: Performed by: FAMILY MEDICINE

## 2018-11-13 PROCEDURE — 00812 ANES LWR INTST SCR COLSC: CPT | Mod: QZ,P2 | Performed by: NURSE ANESTHETIST, CERTIFIED REGISTERED

## 2018-11-13 RX ORDER — PROPOFOL 10 MG/ML
INJECTION, EMULSION INTRAVENOUS CONTINUOUS PRN
Status: DISCONTINUED | OUTPATIENT
Start: 2018-11-13 | End: 2018-11-13

## 2018-11-13 RX ORDER — SODIUM CHLORIDE, SODIUM LACTATE, POTASSIUM CHLORIDE, CALCIUM CHLORIDE 600; 310; 30; 20 MG/100ML; MG/100ML; MG/100ML; MG/100ML
INJECTION, SOLUTION INTRAVENOUS CONTINUOUS
Status: DISCONTINUED | OUTPATIENT
Start: 2018-11-13 | End: 2018-11-13 | Stop reason: HOSPADM

## 2018-11-13 RX ORDER — PROPOFOL 10 MG/ML
INJECTION, EMULSION INTRAVENOUS
Status: DISCONTINUED | OUTPATIENT
Start: 2018-11-13 | End: 2018-11-13

## 2018-11-13 RX ORDER — LIDOCAINE HCL/PF 100 MG/5ML
SYRINGE (ML) INTRAVENOUS
Status: DISCONTINUED | OUTPATIENT
Start: 2018-11-13 | End: 2018-11-13

## 2018-11-13 RX ORDER — SODIUM CHLORIDE, SODIUM LACTATE, POTASSIUM CHLORIDE, CALCIUM CHLORIDE 600; 310; 30; 20 MG/100ML; MG/100ML; MG/100ML; MG/100ML
INJECTION, SOLUTION INTRAVENOUS CONTINUOUS
Status: DISCONTINUED | OUTPATIENT
Start: 2018-11-13 | End: 2018-11-13

## 2018-11-13 RX ORDER — SODIUM CHLORIDE 0.9 % (FLUSH) 0.9 %
3 SYRINGE (ML) INJECTION
Status: DISCONTINUED | OUTPATIENT
Start: 2018-11-13 | End: 2018-11-13 | Stop reason: HOSPADM

## 2018-11-13 RX ADMIN — PROPOFOL 150 MCG/KG/MIN: 10 INJECTION, EMULSION INTRAVENOUS at 11:11

## 2018-11-13 RX ADMIN — SODIUM CHLORIDE, SODIUM LACTATE, POTASSIUM CHLORIDE, AND CALCIUM CHLORIDE: .6; .31; .03; .02 INJECTION, SOLUTION INTRAVENOUS at 11:11

## 2018-11-13 RX ADMIN — SODIUM CHLORIDE, SODIUM LACTATE, POTASSIUM CHLORIDE, AND CALCIUM CHLORIDE: .6; .31; .03; .02 INJECTION, SOLUTION INTRAVENOUS at 10:11

## 2018-11-13 RX ADMIN — LIDOCAINE HYDROCHLORIDE 50 MG: 20 INJECTION, SOLUTION INTRAVENOUS at 11:11

## 2018-11-13 RX ADMIN — PROPOFOL 100 MG: 10 INJECTION, EMULSION INTRAVENOUS at 11:11

## 2018-11-13 NOTE — ANESTHESIA POSTPROCEDURE EVALUATION
Anesthesia Post Evaluation    Patient: Daniel Vogel JrYamila    Procedure(s) Performed: Procedure(s) (LRB):  COLONOSCOPY (N/A)    Final Anesthesia Type: general  Patient location during evaluation: PACU  Patient participation: Yes- Able to Participate  Level of consciousness: awake and alert and oriented  Post-procedure vital signs: reviewed and stable  Pain management: adequate  Airway patency: patent  PONV status at discharge: No PONV  Anesthetic complications: no      Cardiovascular status: blood pressure returned to baseline and hemodynamically stable  Respiratory status: unassisted, spontaneous ventilation and room air  Hydration status: euvolemic  Follow-up not needed.        Visit Vitals  /76 (BP Location: Left arm, Patient Position: Lying)   Pulse 68   Temp 36.2 °C (97.2 °F)   Resp 16   SpO2 96%       Pain/Todd Score: Pain Assessment Performed: Yes (11/13/2018 12:35 PM)  Presence of Pain: denies (11/13/2018 12:35 PM)  Todd Score: 10 (11/13/2018 12:35 PM)

## 2018-11-13 NOTE — H&P
Subjective:    Daniel Vogel Jr. is a 53 y.o. male here for colonoscopy    Past Medical History:   Diagnosis Date    Elevated PSA     High cholesterol     Kidney stone     Urinary tract infection      Past Surgical History:   Procedure Laterality Date    COLON SURGERY      fissure    ELBOW SURGERY      LITHOTRIPSY-EXTRACORPOREAL SHOCK WAVE Left 9/21/2016    Per[facility]    NASAL SEPTUM SURGERY      PROSTATE SURGERY      bx    SHOULDER ARTHROSCOPY      UVULECTOMY       Family History   Problem Relation Age of Onset    Cancer Father         Renal Cancer    Cancer Maternal Aunt     Cancer Maternal Uncle     Cancer Paternal Aunt     Cancer Paternal Uncle     Heart attack Mother     Stroke Mother     Diabetes Mother     Prostate cancer Neg Hx     Kidney disease Neg Hx      Social History     Tobacco Use    Smoking status: Never Smoker    Smokeless tobacco: Never Used   Substance Use Topics    Alcohol use: Yes     Comment: social    Drug use: No       PTA Medications   Medication Sig    aspirin (ECOTRIN) 81 MG EC tablet Take 81 mg by mouth once daily.    atorvastatin (LIPITOR) 40 MG tablet TAKE 1 TABLET DAILY    azelastine (ASTELIN) 137 mcg (0.1 %) nasal spray 1-2 sprays (137-274 mcg total) by Nasal route 2 (two) times daily.    cetirizine (ZYRTEC) 10 MG tablet Take 10 mg by mouth once daily.    clobetasol (TEMOVATE) 0.05 % cream Apply topically 2 (two) times daily. for 10 days    fluticasone (FLONASE) 50 mcg/actuation nasal spray     omega-3 acid ethyl esters (LOVAZA) 1 gram capsule TAKE 2 CAPSULES TWICE A DAY OR AS DIRECTED       Review of patient's allergies indicates:  No Known Allergies    Review of Systems   Constitutional: Negative for fever and chills.   HENT: Negative for hearing loss.    Eyes: Negative for blurred vision and double vision.   Respiratory: Negative for cough and shortness of breath.    Cardiovascular: Negative for chest pain and  palpitations.   Gastrointestinal: Negative for heartburn, nausea, vomiting and abdominal pain.   Genitourinary: Negative for dysuria and frequency.   Musculoskeletal: Negative for myalgias, joint pain and falls.   Skin: Negative for itching and rash.   Neurological: Negative for dizziness, tingling and headaches.   Endo/Heme/Allergies: Does not bruise/bleed easily.   Psychiatric/Behavioral: Negative for depression and suicidal ideas.       Objective:               Physical Exam   Constitutional: He is oriented to person, place, and time. He appears well-developed and well-nourished.   HENT:   Head: Normocephalic and atraumatic.   Right Ear: External ear normal.   Left Ear: External ear normal.   Nose: Nose normal.   Mouth/Throat: Oropharynx is clear and moist.   Eyes: Conjunctivae normal and EOM are normal. Pupils are equal, round, and reactive to light. Right eye exhibits no discharge. Left eye exhibits no discharge. No scleral icterus.   Neck: Normal range of motion. Neck supple. No JVD present. No tracheal deviation present. No thyromegaly present.   Cardiovascular: Normal rate, regular rhythm, normal heart sounds and intact distal pulses.    No murmur heard.  Pulmonary/Chest: Effort normal and breath sounds normal. No respiratory distress. He has no wheezes. He has no rales. He exhibits no tenderness.   Abdominal: Soft. Bowel sounds are normal. He exhibits no distension and no mass. There is no tenderness. There is no rebound and no guarding.   Musculoskeletal: Normal range of motion.   Lymphadenopathy:     He has no cervical adenopathy.   Neurological: He is alert and oriented to person, place, and time. He has normal reflexes. No cranial nerve deficit. He exhibits normal muscle tone. Coordination normal.   Skin: Skin is warm and dry.   Psychiatric: He has a normal mood and affect. His behavior is normal. Judgment and thought content normal.           Assessment:      Active Hospital Problems    Diagnosis  POA     *Colon cancer screening [Z12.11]  Not Applicable      Resolved Hospital Problems   No resolved problems to display.         Plan:    ASA grade 2. Proceed with MAC for colonoscopy    Patient cleared for Anesthesia:  MAC    Anesthesia/Surgery risks, benefits, and alternative options discussed and understood by patient/family.

## 2018-11-13 NOTE — LETTER
November 13, 2018    Daniel Vogel  [address] [address]      [address] [address]  Phone: [phone]  Fax: [phone] Daniel Vogel    Was treated here on 11/13/2018    May Return to work/school on 11/14/18             Sincerely,    Jogre Urrutia MD

## 2018-11-13 NOTE — TRANSFER OF CARE
Anesthesia Transfer of Care Note    Patient: Daniel Vogel JrYamila    Procedure(s) Performed: Procedure(s) (LRB):  COLONOSCOPY (N/A)    Patient location: PACU    Anesthesia Type: general    Transport from OR: Transported from OR on 6-10 L/min O2 by face mask with adequate spontaneous ventilation    Post pain: adequate analgesia    Post assessment: no apparent anesthetic complications and tolerated procedure well    Post vital signs: stable    Level of consciousness: sedated    Nausea/Vomiting: no nausea/vomiting    Complications: none    Transfer of care protocol was followed      Last vitals:   Visit Vitals  /82 (BP Location: Left arm, Patient Position: Lying)   Pulse 70   Temp 36.2 °C (97.2 °F)   Resp 18   SpO2 95%

## 2018-11-13 NOTE — DISCHARGE SUMMARY
Operative Note     SUMMARY     Surgery Date: 11/13/2018     Surgeon(s) and Role:     * Jorge Urrutia MD - Primary    Pre-op Diagnosis:  Screening for colon cancer [Z12.11]    Post-op Diagnosis: diverticulosis    Procedure(s) (LRB):  COLONOSCOPY (N/A)    Anesthesia: General    Findings/Key Components:  diverticulosis    Estimated Blood Loss: 0         Specimens (From admission, onward)    None            Discharge Note      SUMMARY     Admit Date: 11/13/2018    Attending Physician: Jorge Urrutia MD     Discharge Physician: Jorge Urrutia MD    Discharge Date: 11/13/2018     Condition on discharge:  stable    Final Diagnosis: diverticulosis    Disposition: Home or Self Care    Patient Instructions:   Current Discharge Medication List      CONTINUE these medications which have NOT CHANGED    Details   aspirin (ECOTRIN) 81 MG EC tablet Take 81 mg by mouth once daily.      atorvastatin (LIPITOR) 40 MG tablet TAKE 1 TABLET DAILY  Qty: 90 tablet, Refills: 2    Associated Diagnoses: Dyslipidemia      azelastine (ASTELIN) 137 mcg (0.1 %) nasal spray 1-2 sprays (137-274 mcg total) by Nasal route 2 (two) times daily.  Qty: 30 mL, Refills: 5    Associated Diagnoses: Seasonal allergic rhinitis due to pollen      cetirizine (ZYRTEC) 10 MG tablet Take 10 mg by mouth once daily.      clobetasol (TEMOVATE) 0.05 % cream Apply topically 2 (two) times daily. for 10 days  Qty: 60 g, Refills: 5    Associated Diagnoses: Psoriasis      fluticasone (FLONASE) 50 mcg/actuation nasal spray       omega-3 acid ethyl esters (LOVAZA) 1 gram capsule TAKE 2 CAPSULES TWICE A DAY OR AS DIRECTED  Qty: 180 capsule, Refills: 3    Associated Diagnoses: Dyslipidemia             Discharge Procedure Orders (must include Diet, Follow-up, Activity)   Discharge Procedure Orders (must include Diet, Follow-up, Activity)   Diet general     Call MD for:  temperature >100.4     Call MD for:  persistent nausea and vomiting     Call MD for:  severe  uncontrolled pain     Call MD for:  difficulty breathing, headache or visual disturbances     Call MD for:  hives     Call MD for:  persistent dizziness or light-headedness     Call MD for:  extreme fatigue

## 2018-11-13 NOTE — DISCHARGE INSTRUCTIONS
If sedated do not drive, smoke or drink alcohol for 10 hours  Avoid heavy lifting,straining or running for 3 days  You may not have a bowel movement for 1-3 days after procedure  You may return to normal activities the day after  You may experience some bloating and excessive gas.  This can be relieved by walking, eating lightly,or a heating pad can be applied to the abdomen.   A small amount of blood from the rectum  If sedated do not drive, smoke or drink alcohol for 10 hours  Avoid heavy lifting,straining or running for 3 days  You may not have a bowel movement for 1-3 days after procedure  You may return to normal activities the day after  You may experience some bloating and excessive gas.  This can be relieved by walking, eating lightly,or a heating pad can be applied to the abdomen.   A small amount of blood from the return is not serious, especially if hemorrhoids are present,  Go to ER if you notice any;   Chills and/or fever over 101   Persistent vomiting or vomiting blood   Severe abdominal pain, other gas cramp   Severe chest pain   Black tarry stools   Bright red bleeding from your rectum (greater than 1 tablespoon    If EGD, please do not eat or drink until  _________________    Call your doctor for any unusual pain,bleeding or fever. is not serious, especially if hemorrhoids are present,  Go to ER if you notice any;   Chills and/or fever over 101   Persistent vomiting or vomiting blood   Severe abdominal pain, other gas cramp   Severe chest pain   Black tarry stools   Bright red bleeding from your rectum (greater than 1 tablespoon    If EGD, please do not eat or drink until  _________________    Call your doctor for any unusual pain,bleeding or fever.

## 2018-11-13 NOTE — PROCEDURES
11/13/2018    Surgeon:  Jorge Urrutia MD    Preop diagnosis: Screening for colon cancer [Z12.11]    Postop diagnosis: diverticulosis of sigmoid colon    Complications: none    EBL: 0    The patient presented to the endoscopy suite for a long colonoscopy. The patient signed the informed consent after undergoing bowel prep night before the procedure. IV fluids were started. The patient was given IV sedation using propofol with good results. Rectal exam was performed. The patient had good sphincter tone, no masses palpable.   Normal prostate.  Colonoscope was inserted and with air insufflation and direct visualization of the lumen the colonoscope was advanced to the cecum. The cecum was normal, ascending colon was normal, transverse colon was normal, descending colon and sigmoid colon was normal except for a scattered diverticuli. The scope was retroflexed in the rectal area reviewing the anal rectal junction. This demonstrated small flat hemorrhoids. No bleeding was noted. No fissures. Scope was placed back into neutral position and removed. Patient recovered nicely and was discharged home without any events.     Plan will be to repeat the colonoscopy in 10 years.

## 2018-11-13 NOTE — ANESTHESIA POSTPROCEDURE EVALUATION
Anesthesia Post Evaluation    Patient: Daniel Vogel JrYamila    Procedure(s) Performed: Procedure(s) (LRB):  COLONOSCOPY (N/A)    Final Anesthesia Type: general  Patient location during evaluation: PACU  Patient participation: Yes- Able to Participate  Level of consciousness: awake and alert, oriented and awake  Post-procedure vital signs: reviewed and stable  Pain management: adequate  Airway patency: patent  PONV status at discharge: No PONV  Anesthetic complications: no      Cardiovascular status: blood pressure returned to baseline, hemodynamically stable and stable  Respiratory status: unassisted, spontaneous ventilation and room air  Hydration status: euvolemic  Follow-up not needed.        Visit Vitals  /76 (BP Location: Left arm, Patient Position: Lying)   Pulse 65   Temp 36.2 °C (97.2 °F)   Resp 16   SpO2 97%       Pain/Todd Score: Pain Assessment Performed: Yes (11/13/2018 12:10 PM)  Presence of Pain: non-verbal indicators absent (11/13/2018 12:10 PM)  Todd Score: 8 (11/13/2018 12:10 PM)

## 2018-12-07 ENCOUNTER — LAB VISIT (OUTPATIENT)
Dept: LAB | Facility: HOSPITAL | Age: 53
End: 2018-12-07
Attending: FAMILY MEDICINE
Payer: COMMERCIAL

## 2018-12-07 DIAGNOSIS — Z12.11 COLON CANCER SCREENING: ICD-10-CM

## 2018-12-07 PROCEDURE — 82274 ASSAY TEST FOR BLOOD FECAL: CPT

## 2018-12-17 ENCOUNTER — PATIENT MESSAGE (OUTPATIENT)
Dept: FAMILY MEDICINE | Facility: CLINIC | Age: 53
End: 2018-12-17

## 2018-12-17 LAB — HEMOCCULT STL QL IA: NEGATIVE

## 2020-01-02 ENCOUNTER — TELEPHONE (OUTPATIENT)
Dept: ADMINISTRATIVE | Facility: HOSPITAL | Age: 55
End: 2020-01-02

## 2020-08-21 DIAGNOSIS — Z11.59 NEED FOR HEPATITIS C SCREENING TEST: ICD-10-CM

## 2020-12-06 ENCOUNTER — CLINICAL SUPPORT (OUTPATIENT)
Dept: URGENT CARE | Facility: CLINIC | Age: 55
End: 2020-12-06
Payer: COMMERCIAL

## 2020-12-06 VITALS — TEMPERATURE: 98 F

## 2020-12-06 DIAGNOSIS — U07.1 COVID-19: Primary | ICD-10-CM

## 2020-12-06 PROCEDURE — 99211 OFF/OP EST MAY X REQ PHY/QHP: CPT | Mod: S$GLB,,, | Performed by: NURSE PRACTITIONER

## 2020-12-06 PROCEDURE — 99211 PR OFFICE/OUTPT VISIT, EST, LEVL I: ICD-10-PCS | Mod: S$GLB,,, | Performed by: NURSE PRACTITIONER

## 2020-12-06 PROCEDURE — U0003 INFECTIOUS AGENT DETECTION BY NUCLEIC ACID (DNA OR RNA); SEVERE ACUTE RESPIRATORY SYNDROME CORONAVIRUS 2 (SARS-COV-2) (CORONAVIRUS DISEASE [COVID-19]), AMPLIFIED PROBE TECHNIQUE, MAKING USE OF HIGH THROUGHPUT TECHNOLOGIES AS DESCRIBED BY CMS-2020-01-R: HCPCS

## 2020-12-06 NOTE — PROGRESS NOTES

## 2020-12-08 LAB — SARS-COV-2 RNA RESP QL NAA+PROBE: NOT DETECTED

## 2020-12-24 ENCOUNTER — CLINICAL SUPPORT (OUTPATIENT)
Dept: URGENT CARE | Facility: CLINIC | Age: 55
End: 2020-12-24
Payer: COMMERCIAL

## 2020-12-24 VITALS — TEMPERATURE: 98 F

## 2020-12-24 DIAGNOSIS — Z11.52 ENCOUNTER FOR SCREENING LABORATORY TESTING FOR COVID-19 VIRUS IN ASYMPTOMATIC PATIENT: Primary | ICD-10-CM

## 2020-12-24 DIAGNOSIS — Z01.812 ENCOUNTER FOR SCREENING LABORATORY TESTING FOR COVID-19 VIRUS IN ASYMPTOMATIC PATIENT: Primary | ICD-10-CM

## 2020-12-24 LAB
CTP QC/QA: YES
SARS-COV-2 RDRP RESP QL NAA+PROBE: NEGATIVE

## 2020-12-24 PROCEDURE — U0002: ICD-10-PCS | Mod: QW,S$GLB,, | Performed by: NURSE PRACTITIONER

## 2020-12-24 PROCEDURE — U0002 COVID-19 LAB TEST NON-CDC: HCPCS | Mod: QW,S$GLB,, | Performed by: NURSE PRACTITIONER

## 2021-01-04 ENCOUNTER — PATIENT MESSAGE (OUTPATIENT)
Dept: ADMINISTRATIVE | Facility: HOSPITAL | Age: 56
End: 2021-01-04

## 2021-01-28 ENCOUNTER — CLINICAL SUPPORT (OUTPATIENT)
Dept: URGENT CARE | Facility: CLINIC | Age: 56
End: 2021-01-28
Payer: COMMERCIAL

## 2021-01-28 VITALS — TEMPERATURE: 98 F

## 2021-01-28 DIAGNOSIS — Z20.822 COVID-19 RULED OUT: Primary | ICD-10-CM

## 2021-01-28 LAB
CTP QC/QA: YES
SARS-COV-2 RDRP RESP QL NAA+PROBE: NEGATIVE

## 2021-04-05 ENCOUNTER — PATIENT MESSAGE (OUTPATIENT)
Dept: ADMINISTRATIVE | Facility: HOSPITAL | Age: 56
End: 2021-04-05

## 2021-04-14 ENCOUNTER — OFFICE VISIT (OUTPATIENT)
Dept: INTERNAL MEDICINE | Facility: CLINIC | Age: 56
End: 2021-04-14
Payer: COMMERCIAL

## 2021-04-14 VITALS
RESPIRATION RATE: 20 BRPM | OXYGEN SATURATION: 96 % | HEART RATE: 74 BPM | HEIGHT: 71 IN | DIASTOLIC BLOOD PRESSURE: 82 MMHG | BODY MASS INDEX: 30.13 KG/M2 | WEIGHT: 215.19 LBS | SYSTOLIC BLOOD PRESSURE: 124 MMHG

## 2021-04-14 DIAGNOSIS — K57.30 DIVERTICULOSIS OF LARGE INTESTINE WITHOUT HEMORRHAGE: ICD-10-CM

## 2021-04-14 DIAGNOSIS — R97.20 ELEVATED PSA: ICD-10-CM

## 2021-04-14 DIAGNOSIS — E78.5 DYSLIPIDEMIA: Primary | ICD-10-CM

## 2021-04-14 DIAGNOSIS — N40.0 BENIGN PROSTATIC HYPERPLASIA WITHOUT LOWER URINARY TRACT SYMPTOMS: ICD-10-CM

## 2021-04-14 PROBLEM — R00.2 HEART PALPITATIONS: Status: RESOLVED | Noted: 2017-06-26 | Resolved: 2021-04-14

## 2021-04-14 PROBLEM — Z12.11 COLON CANCER SCREENING: Status: RESOLVED | Noted: 2018-11-13 | Resolved: 2021-04-14

## 2021-04-14 PROCEDURE — 1126F AMNT PAIN NOTED NONE PRSNT: CPT | Mod: S$GLB,,, | Performed by: FAMILY MEDICINE

## 2021-04-14 PROCEDURE — 99999 PR PBB SHADOW E&M-EST. PATIENT-LVL IV: ICD-10-PCS | Mod: PBBFAC,,, | Performed by: FAMILY MEDICINE

## 2021-04-14 PROCEDURE — 99999 PR PBB SHADOW E&M-EST. PATIENT-LVL IV: CPT | Mod: PBBFAC,,, | Performed by: FAMILY MEDICINE

## 2021-04-14 PROCEDURE — 3008F BODY MASS INDEX DOCD: CPT | Mod: CPTII,S$GLB,, | Performed by: FAMILY MEDICINE

## 2021-04-14 PROCEDURE — 99214 OFFICE O/P EST MOD 30 MIN: CPT | Mod: S$GLB,,, | Performed by: FAMILY MEDICINE

## 2021-04-14 PROCEDURE — 3008F PR BODY MASS INDEX (BMI) DOCUMENTED: ICD-10-PCS | Mod: CPTII,S$GLB,, | Performed by: FAMILY MEDICINE

## 2021-04-14 PROCEDURE — 1126F PR PAIN SEVERITY QUANTIFIED, NO PAIN PRESENT: ICD-10-PCS | Mod: S$GLB,,, | Performed by: FAMILY MEDICINE

## 2021-04-14 PROCEDURE — 99214 PR OFFICE/OUTPT VISIT, EST, LEVL IV, 30-39 MIN: ICD-10-PCS | Mod: S$GLB,,, | Performed by: FAMILY MEDICINE

## 2021-04-14 RX ORDER — MULTIVITAMIN
1 TABLET ORAL DAILY
COMMUNITY
End: 2023-10-09

## 2021-04-15 ENCOUNTER — LAB VISIT (OUTPATIENT)
Dept: LAB | Facility: HOSPITAL | Age: 56
End: 2021-04-15
Attending: FAMILY MEDICINE
Payer: COMMERCIAL

## 2021-04-15 DIAGNOSIS — N40.0 BENIGN PROSTATIC HYPERPLASIA WITHOUT LOWER URINARY TRACT SYMPTOMS: ICD-10-CM

## 2021-04-15 DIAGNOSIS — R97.20 ELEVATED PSA: ICD-10-CM

## 2021-04-15 DIAGNOSIS — E78.5 DYSLIPIDEMIA: ICD-10-CM

## 2021-04-15 LAB
ALBUMIN SERPL BCP-MCNC: 4.3 G/DL (ref 3.5–5.2)
ALP SERPL-CCNC: 53 U/L (ref 55–135)
ALT SERPL W/O P-5'-P-CCNC: 30 U/L (ref 10–44)
ANION GAP SERPL CALC-SCNC: 9 MMOL/L (ref 8–16)
AST SERPL-CCNC: 26 U/L (ref 10–40)
BILIRUB SERPL-MCNC: 0.8 MG/DL (ref 0.1–1)
BUN SERPL-MCNC: 12 MG/DL (ref 6–20)
CALCIUM SERPL-MCNC: 9.9 MG/DL (ref 8.7–10.5)
CHLORIDE SERPL-SCNC: 105 MMOL/L (ref 95–110)
CHOLEST SERPL-MCNC: 145 MG/DL (ref 120–199)
CHOLEST/HDLC SERPL: 3.9 {RATIO} (ref 2–5)
CO2 SERPL-SCNC: 28 MMOL/L (ref 23–29)
COMPLEXED PSA SERPL-MCNC: 0.96 NG/ML (ref 0–4)
CREAT SERPL-MCNC: 1.2 MG/DL (ref 0.5–1.4)
EST. GFR  (AFRICAN AMERICAN): >60 ML/MIN/1.73 M^2
EST. GFR  (NON AFRICAN AMERICAN): >60 ML/MIN/1.73 M^2
GLUCOSE SERPL-MCNC: 90 MG/DL (ref 70–110)
HDLC SERPL-MCNC: 37 MG/DL (ref 40–75)
HDLC SERPL: 25.5 % (ref 20–50)
LDLC SERPL CALC-MCNC: 71.6 MG/DL (ref 63–159)
NONHDLC SERPL-MCNC: 108 MG/DL
POTASSIUM SERPL-SCNC: 4.5 MMOL/L (ref 3.5–5.1)
PROT SERPL-MCNC: 7.4 G/DL (ref 6–8.4)
SODIUM SERPL-SCNC: 142 MMOL/L (ref 136–145)
TRIGL SERPL-MCNC: 182 MG/DL (ref 30–150)

## 2021-04-15 PROCEDURE — 36415 COLL VENOUS BLD VENIPUNCTURE: CPT | Performed by: FAMILY MEDICINE

## 2021-04-15 PROCEDURE — 84153 ASSAY OF PSA TOTAL: CPT | Performed by: FAMILY MEDICINE

## 2021-04-15 PROCEDURE — 80061 LIPID PANEL: CPT | Performed by: FAMILY MEDICINE

## 2021-04-15 PROCEDURE — 80053 COMPREHEN METABOLIC PANEL: CPT | Performed by: FAMILY MEDICINE

## 2021-10-20 ENCOUNTER — OFFICE VISIT (OUTPATIENT)
Dept: FAMILY MEDICINE | Facility: CLINIC | Age: 56
End: 2021-10-20
Payer: COMMERCIAL

## 2021-10-20 ENCOUNTER — CLINICAL SUPPORT (OUTPATIENT)
Dept: FAMILY MEDICINE | Facility: CLINIC | Age: 56
End: 2021-10-20
Payer: COMMERCIAL

## 2021-10-20 VITALS
HEIGHT: 71 IN | DIASTOLIC BLOOD PRESSURE: 72 MMHG | WEIGHT: 210.31 LBS | HEART RATE: 60 BPM | BODY MASS INDEX: 29.44 KG/M2 | RESPIRATION RATE: 16 BRPM | SYSTOLIC BLOOD PRESSURE: 120 MMHG

## 2021-10-20 DIAGNOSIS — L40.9 PSORIASIS: ICD-10-CM

## 2021-10-20 DIAGNOSIS — N20.0 RENAL LITHIASIS: ICD-10-CM

## 2021-10-20 DIAGNOSIS — E78.5 DYSLIPIDEMIA: ICD-10-CM

## 2021-10-20 DIAGNOSIS — Z23 IMMUNIZATION DUE: ICD-10-CM

## 2021-10-20 DIAGNOSIS — R10.9 FLANK PAIN, CHRONIC: Primary | ICD-10-CM

## 2021-10-20 DIAGNOSIS — G89.29 FLANK PAIN, CHRONIC: Primary | ICD-10-CM

## 2021-10-20 LAB
ALBUMIN SERPL BCP-MCNC: 4.3 G/DL (ref 3.5–5.2)
ALP SERPL-CCNC: 50 U/L (ref 55–135)
ALT SERPL W/O P-5'-P-CCNC: 24 U/L (ref 10–44)
ANION GAP SERPL CALC-SCNC: 10 MMOL/L (ref 8–16)
AST SERPL-CCNC: 23 U/L (ref 10–40)
BACTERIA SPEC CULT: NORMAL /HPF
BILIRUB SERPL-MCNC: 0.7 MG/DL (ref 0.1–1)
BILIRUB SERPL-MCNC: NORMAL MG/DL
BLOOD URINE, POC: NORMAL
BUN SERPL-MCNC: 12 MG/DL (ref 6–20)
CALCIUM SERPL-MCNC: 9.9 MG/DL (ref 8.7–10.5)
CASTS: NORMAL
CHLORIDE SERPL-SCNC: 108 MMOL/L (ref 95–110)
CHOLEST SERPL-MCNC: 142 MG/DL (ref 120–199)
CHOLEST/HDLC SERPL: 3.8 {RATIO} (ref 2–5)
CO2 SERPL-SCNC: 23 MMOL/L (ref 23–29)
COLOR, POC UA: YELLOW
CREAT SERPL-MCNC: 1.1 MG/DL (ref 0.5–1.4)
CRYSTALS: NORMAL
EST. GFR  (AFRICAN AMERICAN): >60 ML/MIN/1.73 M^2
EST. GFR  (NON AFRICAN AMERICAN): >60 ML/MIN/1.73 M^2
GLUCOSE SERPL-MCNC: 98 MG/DL (ref 70–110)
GLUCOSE UR QL STRIP: NORMAL
HDLC SERPL-MCNC: 37 MG/DL (ref 40–75)
HDLC SERPL: 26.1 % (ref 20–50)
KETONES UR QL STRIP: NORMAL
LDLC SERPL CALC-MCNC: 77.4 MG/DL (ref 63–159)
LEUKOCYTE ESTERASE URINE, POC: NORMAL
NITRITE, POC UA: NORMAL
NONHDLC SERPL-MCNC: 105 MG/DL
PH, POC UA: 5
POTASSIUM SERPL-SCNC: 4.4 MMOL/L (ref 3.5–5.1)
PROT SERPL-MCNC: 7.4 G/DL (ref 6–8.4)
PROTEIN, POC: NORMAL
RBC CELLS COUNTED: NORMAL
SODIUM SERPL-SCNC: 141 MMOL/L (ref 136–145)
SPECIFIC GRAVITY, POC UA: 1.02
TRIGL SERPL-MCNC: 138 MG/DL (ref 30–150)
UROBILINOGEN, POC UA: NORMAL
WHITE BLOOD CELLS: NORMAL

## 2021-10-20 PROCEDURE — 81000 POCT URINALYSIS, DIPSTICK OR TABLET REAGENT, AUTOMATED, WITH MICROSCOP: ICD-10-PCS | Mod: S$GLB,,, | Performed by: FAMILY MEDICINE

## 2021-10-20 PROCEDURE — 3078F PR MOST RECENT DIASTOLIC BLOOD PRESSURE < 80 MM HG: ICD-10-PCS | Mod: CPTII,S$GLB,, | Performed by: FAMILY MEDICINE

## 2021-10-20 PROCEDURE — 99214 PR OFFICE/OUTPT VISIT, EST, LEVL IV, 30-39 MIN: ICD-10-PCS | Mod: 25,S$GLB,, | Performed by: FAMILY MEDICINE

## 2021-10-20 PROCEDURE — 1159F PR MEDICATION LIST DOCUMENTED IN MEDICAL RECORD: ICD-10-PCS | Mod: CPTII,S$GLB,, | Performed by: FAMILY MEDICINE

## 2021-10-20 PROCEDURE — 3074F PR MOST RECENT SYSTOLIC BLOOD PRESSURE < 130 MM HG: ICD-10-PCS | Mod: CPTII,S$GLB,, | Performed by: FAMILY MEDICINE

## 2021-10-20 PROCEDURE — 99999 PR PBB SHADOW E&M-EST. PATIENT-LVL III: ICD-10-PCS | Mod: PBBFAC,,, | Performed by: FAMILY MEDICINE

## 2021-10-20 PROCEDURE — 80061 LIPID PANEL: CPT | Performed by: FAMILY MEDICINE

## 2021-10-20 PROCEDURE — 36415 PR COLLECTION VENOUS BLOOD,VENIPUNCTURE: ICD-10-PCS | Mod: S$GLB,,, | Performed by: FAMILY MEDICINE

## 2021-10-20 PROCEDURE — 3008F PR BODY MASS INDEX (BMI) DOCUMENTED: ICD-10-PCS | Mod: CPTII,S$GLB,, | Performed by: FAMILY MEDICINE

## 2021-10-20 PROCEDURE — 1160F RVW MEDS BY RX/DR IN RCRD: CPT | Mod: CPTII,S$GLB,, | Performed by: FAMILY MEDICINE

## 2021-10-20 PROCEDURE — 1159F MED LIST DOCD IN RCRD: CPT | Mod: CPTII,S$GLB,, | Performed by: FAMILY MEDICINE

## 2021-10-20 PROCEDURE — 99214 OFFICE O/P EST MOD 30 MIN: CPT | Mod: 25,S$GLB,, | Performed by: FAMILY MEDICINE

## 2021-10-20 PROCEDURE — 81000 URINALYSIS NONAUTO W/SCOPE: CPT | Mod: S$GLB,,, | Performed by: FAMILY MEDICINE

## 2021-10-20 PROCEDURE — 90471 IMMUNIZATION ADMIN: CPT | Mod: S$GLB,,, | Performed by: FAMILY MEDICINE

## 2021-10-20 PROCEDURE — 1160F PR REVIEW ALL MEDS BY PRESCRIBER/CLIN PHARMACIST DOCUMENTED: ICD-10-PCS | Mod: CPTII,S$GLB,, | Performed by: FAMILY MEDICINE

## 2021-10-20 PROCEDURE — 3078F DIAST BP <80 MM HG: CPT | Mod: CPTII,S$GLB,, | Performed by: FAMILY MEDICINE

## 2021-10-20 PROCEDURE — 3074F SYST BP LT 130 MM HG: CPT | Mod: CPTII,S$GLB,, | Performed by: FAMILY MEDICINE

## 2021-10-20 PROCEDURE — 90471 FLU VACCINE (QUAD) GREATER THAN OR EQUAL TO 3YO PRESERVATIVE FREE IM: ICD-10-PCS | Mod: S$GLB,,, | Performed by: FAMILY MEDICINE

## 2021-10-20 PROCEDURE — 99999 PR PBB SHADOW E&M-EST. PATIENT-LVL III: CPT | Mod: PBBFAC,,, | Performed by: FAMILY MEDICINE

## 2021-10-20 PROCEDURE — 3008F BODY MASS INDEX DOCD: CPT | Mod: CPTII,S$GLB,, | Performed by: FAMILY MEDICINE

## 2021-10-20 PROCEDURE — 90686 IIV4 VACC NO PRSV 0.5 ML IM: CPT | Mod: S$GLB,,, | Performed by: FAMILY MEDICINE

## 2021-10-20 PROCEDURE — 90686 FLU VACCINE (QUAD) GREATER THAN OR EQUAL TO 3YO PRESERVATIVE FREE IM: ICD-10-PCS | Mod: S$GLB,,, | Performed by: FAMILY MEDICINE

## 2021-10-20 PROCEDURE — 80053 COMPREHEN METABOLIC PANEL: CPT | Performed by: FAMILY MEDICINE

## 2021-10-20 PROCEDURE — 36415 COLL VENOUS BLD VENIPUNCTURE: CPT | Mod: S$GLB,,, | Performed by: FAMILY MEDICINE

## 2021-10-20 RX ORDER — TRIAMCINOLONE ACETONIDE 1 MG/G
CREAM TOPICAL 2 TIMES DAILY
Qty: 45 G | Refills: 5 | Status: SHIPPED | OUTPATIENT
Start: 2021-10-20

## 2021-10-20 RX ORDER — ATORVASTATIN CALCIUM 40 MG/1
40 TABLET, FILM COATED ORAL DAILY
Qty: 90 TABLET | Refills: 1 | Status: SHIPPED | OUTPATIENT
Start: 2021-10-20 | End: 2022-04-18 | Stop reason: SINTOL

## 2021-10-22 ENCOUNTER — HOSPITAL ENCOUNTER (OUTPATIENT)
Dept: RADIOLOGY | Facility: HOSPITAL | Age: 56
Discharge: HOME | End: 2021-10-22
Attending: FAMILY MEDICINE
Payer: COMMERCIAL

## 2021-10-22 DIAGNOSIS — R10.9 FLANK PAIN, CHRONIC: ICD-10-CM

## 2021-10-22 DIAGNOSIS — G89.29 FLANK PAIN, CHRONIC: ICD-10-CM

## 2021-10-22 DIAGNOSIS — N20.0 RENAL LITHIASIS: ICD-10-CM

## 2021-10-22 PROCEDURE — 76770 US EXAM ABDO BACK WALL COMP: CPT | Mod: TC

## 2021-10-22 PROCEDURE — 76770 US EXAM ABDO BACK WALL COMP: CPT | Mod: 26,,, | Performed by: RADIOLOGY

## 2021-10-22 PROCEDURE — 76770 US KIDNEY: ICD-10-PCS | Mod: 26,,, | Performed by: RADIOLOGY

## 2022-01-17 ENCOUNTER — HOSPITAL ENCOUNTER (EMERGENCY)
Facility: HOSPITAL | Age: 57
Discharge: HOME | End: 2022-01-17
Attending: SURGERY
Payer: COMMERCIAL

## 2022-01-17 VITALS
OXYGEN SATURATION: 97 % | DIASTOLIC BLOOD PRESSURE: 80 MMHG | HEART RATE: 59 BPM | BODY MASS INDEX: 28.59 KG/M2 | WEIGHT: 205 LBS | RESPIRATION RATE: 16 BRPM | TEMPERATURE: 99 F | SYSTOLIC BLOOD PRESSURE: 147 MMHG

## 2022-01-17 DIAGNOSIS — R07.81 RIB PAIN ON RIGHT SIDE: ICD-10-CM
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
## 2022-01-17 DIAGNOSIS — S09.90XA INJURY OF HEAD, INITIAL ENCOUNTER: ICD-10-CM

## 2022-01-17 PROCEDURE — 63600175 PHARM REV CODE 636 W HCPCS: Performed by: SURGERY

## 2022-01-17 PROCEDURE — 90715 TDAP VACCINE 7 YRS/> IM: CPT | Performed by: SURGERY

## 2022-01-17 PROCEDURE — 90471 IMMUNIZATION ADMIN: CPT | Performed by: SURGERY

## 2022-01-17 PROCEDURE — 25000003 PHARM REV CODE 250: Performed by: SURGERY

## 2022-01-17 PROCEDURE — 99284 EMERGENCY DEPT VISIT MOD MDM: CPT | Mod: 25

## 2022-01-17 RX ORDER — TRAMADOL HYDROCHLORIDE 50 MG/1
50 TABLET ORAL EVERY 6 HOURS PRN
Qty: 20 TABLET | Refills: 0 | Status: SHIPPED | OUTPATIENT
Start: 2022-01-17 | End: 2022-01-22

## 2022-01-17 RX ORDER — HYDROCODONE BITARTRATE AND ACETAMINOPHEN 5; 325 MG/1; MG/1
1 TABLET ORAL
Status: COMPLETED | OUTPATIENT
Start: 2022-01-17 | End: 2022-01-17

## 2022-01-17 RX ADMIN — TETANUS TOXOID, REDUCED DIPHTHERIA TOXOID AND ACELLULAR PERTUSSIS VACCINE, ADSORBED 0.5 ML: 5; 2.5; 8; 8; 2.5 SUSPENSION INTRAMUSCULAR at 09:01

## 2022-01-17 RX ADMIN — HYDROCODONE BITARTRATE AND ACETAMINOPHEN 1 TABLET: 5; 325 TABLET ORAL at 09:01

## 2022-01-18 NOTE — ED PROVIDER NOTES
Encounter Date: 1/17/2022       History     Chief Complaint   Patient presents with    Fall     Pt slipped in house, fell and hit head on ceramic floor.  Pt unsure if he had LOC.  Swelling and laceration noted to right eye.  C/o rt rib pain.  Pt denies N/V/D.       Daniel Vogel is a 56 y.o. male with PMH of elevated PSA, hyperlipidemia, UTI who presents to the ED for evaluation of head pain after fall.  Patient reports that he accidentally slipped, falling forward onto ceramic floor at home. He notes + LOC that lasted only seconds per wife.   He presents with right periorbital pain, swelling, abrasion and bruising; he also notes right anterior rib pain.   Denies cp or difficulty breathing.   Reports that he takes ASA daily.     The history is provided by the patient.     Review of patient's allergies indicates:  No Known Allergies  Past Medical History:   Diagnosis Date    Elevated PSA     High cholesterol     Kidney stone     Urinary tract infection      Past Surgical History:   Procedure Laterality Date    COLON SURGERY      fissure    COLONOSCOPY  11/13/2018         COLONOSCOPY N/A 11/13/2018    Procedure: COLONOSCOPY;  Surgeon: Jorge Urrutia MD;  Location: [facility];  Service: Endoscopy;  Laterality: N/A;    ELBOW SURGERY      NASAL SEPTUM SURGERY      PROSTATE SURGERY      bx    SHOULDER ARTHROSCOPY      UVULECTOMY       Family History   Problem Relation Age of Onset    Cancer Father         Renal Cancer    Cancer Maternal Aunt     Cancer Maternal Uncle     Cancer Paternal Aunt     Cancer Paternal Uncle     Heart attack Mother     Stroke Mother     Diabetes Mother     Prostate cancer Neg Hx     Kidney disease Neg Hx      Social History     Tobacco Use    Smoking status: Never Smoker    Smokeless tobacco: Never Used   Substance Use Topics    Alcohol use: Yes     Comment: social    Drug use: No     Review of Systems   Constitutional: Negative for appetite change, chills and  fever.   HENT: Positive for facial swelling (right periorbital swelling and bruising). Negative for congestion, ear discharge, ear pain, postnasal drip, rhinorrhea and sore throat.    Eyes: Negative for pain.   Respiratory: Negative for cough, chest tightness and shortness of breath.    Cardiovascular: Negative for chest pain.   Gastrointestinal: Negative for abdominal distention, abdominal pain and nausea.   Genitourinary: Negative for dysuria, flank pain, hematuria and urgency.   Musculoskeletal: Positive for arthralgias (right anterior chest wall ). Negative for back pain.   Skin: Positive for wound (superficial abrasion right eyebrow ). Negative for rash.   Neurological: Negative for dizziness, weakness, numbness and headaches.   Hematological: Does not bruise/bleed easily.       Physical Exam     Initial Vitals [01/17/22 2015]   BP Pulse Resp Temp SpO2   (!) 171/81 70 16 98.7 °F (37.1 °C) 97 %      MAP       --         Physical Exam    Nursing note and vitals reviewed.  Constitutional: He appears well-developed and well-nourished.   HENT:   Head: Normocephalic. Head is with abrasion (right eye brow ) and with contusion (mild, periorbital swelling, bruising ).   Eyes: Conjunctivae and EOM are normal. Pupils are equal, round, and reactive to light.   Neck: Neck supple.   Cardiovascular: Normal rate, regular rhythm, normal heart sounds and intact distal pulses.   Pulmonary/Chest: Effort normal and breath sounds normal. He has no decreased breath sounds. He has no wheezes. He has no rhonchi. He has no rales. He exhibits tenderness (right anterior CW. ). He exhibits no crepitus.   Abdominal: Abdomen is soft. Bowel sounds are normal.   Musculoskeletal:         General: Normal range of motion.      Cervical back: Neck supple.     Neurological: He is alert and oriented to person, place, and time. He has normal strength.   Skin: Skin is warm and dry.   Psychiatric: He has a normal mood and affect. His behavior is  normal. Judgment and thought content normal.         ED Course   Procedures  Labs Reviewed - No data to display       Imaging Results          CT Maxillofacial Without Contrast (Final result)  Result time 01/17/22 21:11:29    Final result by Abdulkadir Waggoner MD (01/17/22 21:11:29)                 Impression:      1. No acute facial fractures.  2. Small probable right supraorbital superficial laceration.      Electronically signed by: Abdulkadir Waggoner  Date:    01/17/2022  Time:    21:11             Narrative:    EXAMINATION:  CT MAXILLOFACIAL WITHOUT CONTRAST    CLINICAL HISTORY:  Facial trauma, blunt;    TECHNIQUE:  Low dose axial images, sagittal and coronal reformations were obtained through the face.  Contrast was not administered.    COMPARISON:  None    FINDINGS:  No acute facial fractures are detected.  Nasal bones are intact.  Zygomatic arches are intact.  Paranasal sinuses are intact.  The mandible is intact.    Small probable right supraorbital superficial laceration with minimal air in the soft tissues.    The globes appear within normal limits.    Paranasal sinuses are clear.                               CT Head Without Contrast (Final result)  Result time 01/17/22 21:15:29    Final result by Abdulkadir Waggoner MD (01/17/22 21:15:29)                 Impression:      No acute intracranial process.      Electronically signed by: Abdulkadir Waggoner  Date:    01/17/2022  Time:    21:15             Narrative:    EXAMINATION:  CT HEAD WITHOUT CONTRAST    CLINICAL HISTORY:  Facial trauma, blunt;    TECHNIQUE:  Low dose axial CT images obtained throughout the head without intravenous contrast. Sagittal and coronal reconstructions were performed.    COMPARISON:  None.    FINDINGS:  Intracranial compartment:    Ventricles and sulci are normal in size for age without evidence of hydrocephalus. No extra-axial blood or fluid collections.    The brain parenchyma appears normal. No parenchymal mass, hemorrhage, edema or major  vascular distribution infarct.    Skull/extracranial contents (limited evaluation): No fracture. Mastoid air cells and paranasal sinuses are essentially clear.    Small probable superficial right supraorbital scalp laceration with minimal air in the superficial soft tissues.                               X-Ray Ribs 2 View Right (Final result)  Result time 01/17/22 21:03:52    Final result by Abdulkadir Waggoner MD (01/17/22 21:03:52)                 Impression:      No acute radiographic abnormality.      Electronically signed by: Abdulkadir Waggoner  Date:    01/17/2022  Time:    21:03             Narrative:    EXAMINATION:  XR RIBS 2 VIEW RIGHT    CLINICAL HISTORY:  Pleurodynia    TECHNIQUE:  Four views of the right ribs were performed.    COMPARISON:  06/19/2017    FINDINGS:  No acute rib fractures are detected.  Right hemithorax is clear.  No osseous destruction.                                 Medications   Tdap (BOOSTRIX) vaccine injection 0.5 mL (has no administration in time range)   HYDROcodone-acetaminophen 5-325 mg per tablet 1 tablet (has no administration in time range)     Medical Decision Making:   Differential Diagnosis:   Orbital fx, ICH, SDH, SAH   Rib contusion, fx   Clinical Tests:   Radiological Study: Ordered and Reviewed  ED Management:  Evaluation of a 56-year-old male with accidental slip and fall at home, injuring right eye.  He does note loss of consciousness.  He presents with stable vital signs, no focal deficits on exam.  Right periorbital contusion, swelling.  No pain with EOM.  Small, superficial abrasion noted to right eyebrow.  Bleeding is controlled.  + TTP right anterior ribcage.  Breath sounds are clear bilaterally.  CT head, CT max face negative.  Right rib views negative.  Tdap given.  Patient will be discharged home, strict follow-up with PCP.  Return to the ED with any concerning signs or symptoms.                      Clinical Impression:   Final diagnoses:  [R07.81] Rib pain on  right side  [W19.XXXA] Fall, initial encounter (Primary)  [S09.90XA] Injury of head, initial encounter          ED Disposition Condition    Discharge Stable        ED Prescriptions     Medication Sig Dispense Start Date End Date Auth. Provider    traMADoL (ULTRAM) 50 mg tablet Take 1 tablet (50 mg total) by mouth every 6 (six) hours as needed for Pain. 20 tablet 1/17/2022 1/22/2022 Jonh Salazar MD        Follow-up Information     Follow up With Specialties Details Why Contact Info    Jorge Urrutia MD Family Medicine Schedule an appointment as soon as possible for a visit in 2 days  [address] [address]  [phone]             Indira Cruz NP  01/17/22 2120

## 2022-01-20 ENCOUNTER — TELEPHONE (OUTPATIENT)
Dept: FAMILY MEDICINE | Facility: CLINIC | Age: 57
End: 2022-01-20
Payer: COMMERCIAL

## 2022-01-20 NOTE — TELEPHONE ENCOUNTER
----- Message from Cornelio Turner sent at 2022  9:13 AM CST -----  Contact: Patient  Daniel Vogel  MRN: [id number]  : 1965  PCP: Jorge Urrutia  Home Phone      [phone]  Work Phone      Not on file.  Mobile          [phone]      MESSAGE: requesting appt for ER f/u (head trauma) -- was told to see PCP in 2 days -- would like appt today or tomorrow    Call [phone]    PCP: Ghada

## 2022-01-21 ENCOUNTER — OFFICE VISIT (OUTPATIENT)
Dept: INTERNAL MEDICINE | Facility: CLINIC | Age: 57
End: 2022-01-21
Payer: COMMERCIAL

## 2022-01-21 VITALS
HEIGHT: 71 IN | BODY MASS INDEX: 29.44 KG/M2 | WEIGHT: 210.31 LBS | SYSTOLIC BLOOD PRESSURE: 120 MMHG | RESPIRATION RATE: 14 BRPM | HEART RATE: 79 BPM | DIASTOLIC BLOOD PRESSURE: 78 MMHG

## 2022-01-21 DIAGNOSIS — S05.11XD ECCHYMOSIS OF RIGHT EYE, SUBSEQUENT ENCOUNTER: ICD-10-CM

## 2022-01-21 DIAGNOSIS — S06.0X1D CONCUSSION WITH LOSS OF CONSCIOUSNESS OF 30 MINUTES OR LESS, SUBSEQUENT ENCOUNTER: Primary | ICD-10-CM

## 2022-01-21 PROCEDURE — 99999 PR PBB SHADOW E&M-EST. PATIENT-LVL IV: CPT | Mod: PBBFAC,,, | Performed by: FAMILY MEDICINE

## 2022-01-21 PROCEDURE — 3074F SYST BP LT 130 MM HG: CPT | Mod: CPTII,S$GLB,, | Performed by: FAMILY MEDICINE

## 2022-01-21 PROCEDURE — 3008F BODY MASS INDEX DOCD: CPT | Mod: CPTII,S$GLB,, | Performed by: FAMILY MEDICINE

## 2022-01-21 PROCEDURE — 99214 OFFICE O/P EST MOD 30 MIN: CPT | Mod: S$GLB,,, | Performed by: FAMILY MEDICINE

## 2022-01-21 PROCEDURE — 3008F PR BODY MASS INDEX (BMI) DOCUMENTED: ICD-10-PCS | Mod: CPTII,S$GLB,, | Performed by: FAMILY MEDICINE

## 2022-01-21 PROCEDURE — 99214 PR OFFICE/OUTPT VISIT, EST, LEVL IV, 30-39 MIN: ICD-10-PCS | Mod: S$GLB,,, | Performed by: FAMILY MEDICINE

## 2022-01-21 PROCEDURE — 3078F DIAST BP <80 MM HG: CPT | Mod: CPTII,S$GLB,, | Performed by: FAMILY MEDICINE

## 2022-01-21 PROCEDURE — 99999 PR PBB SHADOW E&M-EST. PATIENT-LVL IV: ICD-10-PCS | Mod: PBBFAC,,, | Performed by: FAMILY MEDICINE

## 2022-01-21 PROCEDURE — 1159F MED LIST DOCD IN RCRD: CPT | Mod: CPTII,S$GLB,, | Performed by: FAMILY MEDICINE

## 2022-01-21 PROCEDURE — 1159F PR MEDICATION LIST DOCUMENTED IN MEDICAL RECORD: ICD-10-PCS | Mod: CPTII,S$GLB,, | Performed by: FAMILY MEDICINE

## 2022-01-21 PROCEDURE — 3074F PR MOST RECENT SYSTOLIC BLOOD PRESSURE < 130 MM HG: ICD-10-PCS | Mod: CPTII,S$GLB,, | Performed by: FAMILY MEDICINE

## 2022-01-21 PROCEDURE — 3078F PR MOST RECENT DIASTOLIC BLOOD PRESSURE < 80 MM HG: ICD-10-PCS | Mod: CPTII,S$GLB,, | Performed by: FAMILY MEDICINE

## 2022-01-21 PROCEDURE — 1160F PR REVIEW ALL MEDS BY PRESCRIBER/CLIN PHARMACIST DOCUMENTED: ICD-10-PCS | Mod: CPTII,S$GLB,, | Performed by: FAMILY MEDICINE

## 2022-01-21 PROCEDURE — 1160F RVW MEDS BY RX/DR IN RCRD: CPT | Mod: CPTII,S$GLB,, | Performed by: FAMILY MEDICINE

## 2022-01-21 NOTE — PATIENT INSTRUCTIONS
Patient Education       Concussion Discharge Instructions, Adult   About this topic   Concussion is a mild brain or head injury. Many people recover quickly after a concussion. But, sometimes, symptoms can last for several days or longer.     Some signs of a concussion may show up right away or they could show up several hours, days, or even weeks later. You may have been knocked out at the time of the injury. Other signs may include:  · Headache  · Upset stomach or throwing up  · Feeling tired or have trouble sleeping  · Trouble walking or talking  · Problems with feeling confused or not able to remember what happened  · Trouble paying attention  · Feeling cranky or out of sorts or other mood or behavior problems  · Changes in vision  · Feeling bothered by noise or light  Signs may disappear quickly or may linger for several days, weeks, or even months.  What care is needed at home?   · Ask your doctor what you need to do when you go home. Make sure you ask questions if you do not understand what you need to do.  · If the doctors told you to have someone stay with you, it is important that they understand to make sure you are breathing normally, not throwing up, and not moaning while you sleep. They also need to know when to get emergency help.  · Call your regular doctor to let them know you were in the ED. Make a follow-up appointment if you were told to.  · Rest your body. Get plenty of sleep. Alternate rest with light activity like walking. Avoid heavy exercise if it makes you feel worse.  · Rest your brain. For the first day, stay away from doing things that need a lot of thought or focus. Stay away from TV, computers, phone screens, and video games. After the first day, slowly introduce these activities. Stop them if they make you feel worse.  · If your head hurts, you may want to take medicine like ibuprofen, naproxen, or acetaminophen.  · Place an ice pack or a bag of frozen peas wrapped in a towel over the  painful part. Never put ice straight on the skin. Do not leave the ice on more than 10 to 15 minutes at a time.  · Do not drink beer, wine, and mixed drinks (alcohol) until you fully recover.  · Do not use recreational drugs like cocaine, methamphetamines, or heroin.     What follow-up care is needed?   · Your doctor may ask you to make visits to the office to check on your progress. Be sure to keep your visits.   · Your doctor may do tests, such as a CT scan, MRI, or x-rays. These tests will check to see if other structures inside your head were harmed.  · Your doctor may send you to a rehab expert. They may be able to help you to get your brain function back and help you to recover faster.  What drugs may be needed?   The doctor may order drugs to:  · Help with pain  · Help with dizziness  · Treat or prevent seizures  Will physical activity be limited?   · Physical activity may be limited for some time. Doing things that require thinking or memory might also be limited. Check with your doctor about when you can go back to your normal activities. These may be limited as long as you have the signs of a concussion.  · You should be able to do light activities like reading and walking. Slowly add to your activities. Avoid tiring activities, heavy exercise, and swimming.  · Ask your doctor when it is okay for you to drive.  · When you go back to work, talk to your employer about your job. You may need some limits on what you do. You may need to have someone check your work every now and then. Make sure teachers know of your problem if you are in school.  · Avoid doing things that may put you at risk of another head injury.  · Ask your doctor when it will be safe for you to return to playing sports.  What problems could happen?   · Bleeding or swelling in the brain  · Damage to the brain which may lead to changes in mental, physical, and emotional behavior  · Seizures  · Low mood  · Hearing, smelling, or eye  problems  · Memory loss  · Dizziness  · Headaches  · If you get a new concussion while not yet fully recovered from the first one, you might have brain swelling which could be dangerous. Multiple concussions can lead to permanent brain damage and even death.  What can be done to prevent this health problem?   · Do not drive when you are taking drugs for pain or that cause you to be sleepy. Do not drink alcohol and drive. Do not drive when you are tired.  · Always wear a seatbelt when you drive or ride in a car.  · Wear proper protective equipment when you play sports.  · Wear a helmet when riding a motorcycle, bicycle, skateboard, roller skates, or when skiing or snowboarding or doing other similar activity.  · Stay away from unsafe activities that may cause falls.  · Wear hard hats and protective gear if you work in construction or other dangerous jobs or if you work on ladders or in high places.   When do I need to call the doctor?   · You have trouble waking up from sleep and remain groggy or confused once awake.  · While you are awake, you become confused or have trouble thinking clearly.  · You have trouble speaking or seeing.  · You have trouble walking or cannot move a part of your body like an arm or leg.  · You have a seizure.  · You develop severe or worsening headaches.  · You start throwing up.  · You still have symptoms that interfere with your normal activities 1 week after your injury.  · You feel generally weaker or more tired than usual.  · You have behavior changes like angry outbursts or thoughts of hurting yourself or others.  · You have bleeding or clear liquid drainage from your ears or nose.  Teach Back: Helping You Understand   The Teach Back Method helps you understand the information we are giving you. After you talk with the staff, tell them in your own words what you learned. This helps to make sure the staff has described each thing clearly. It also helps to explain things that may have  been confusing. Before going home, make sure you can do these:  · I can tell you about my condition.  · I can tell you why it is important to protect my brain from another concussion while it is healing.  · I can tell you what I will do if I have problems remembering things.  Where can I learn more?   American Academy [employer]icians  https://familydoctor.org/condition/concussion/   Centers for Disease Control and Prevention  https://www.cdc.gov/headsup/index.html   NHS Choices  https://www.nhs.uk/conditions/concussion/   Last Reviewed Date   2021-06-10  Consumer Information Use and Disclaimer   This information is not specific medical advice and does not replace information you receive from your health care provider. This is only a brief summary of general information. It does NOT include all information about conditions, illnesses, injuries, tests, procedures, treatments, therapies, discharge instructions or life-style choices that may apply to you. You must talk with your health care provider for complete information about your health and treatment options. This information should not be used to decide whether or not to accept your health care providers advice, instructions or recommendations. Only your health care provider has the knowledge and training to provide advice that is right for you.  Copyright   Copyright © 2021 UpToDate, Inc. and its affiliates and/or licensors. All rights reserved.  Patient Education       Black Eye   The Basics   Written by the doctors and editors at [facility]   What is a black eye? -- A black eye happens when something hits your eye, cheek, or nose and causes a bruise above or under your eye. Bruises happen when blood vessels under the skin break, but the skin isn't cut. When the blood vessels break, blood leaks into the tissues under the skin and makes it change color.  A black eye starts off red in color, and then turns blue or purple. As it heals, a black eye can turn  "green and yellow. Often, the swelling gets worse in the first day after the injury or when you wake up the next morning. Most black eyes heal in 1 to 2 weeks, but some take longer.  How is a black eye treated? -- A black eye doesn't need treatment. It will get better on its own. But you can "ice" your black eye to make it feel better and help it heal. To do this, put a cold gel pack, bag of ice, or bag of frozen vegetables on the injured area every 1 to 2 hours, for 15 minutes each time. Put a thin towel between the ice (or other cold object) and your skin. Use the ice (or other cold object) for at least 6 hours after your injury. Some people find it helpful to ice longer, even up to 2 days after their injury.  You can also take medicines such as acetaminophen (sample brand name: Tylenol) or ibuprofen (sample brand names: Advil, Motrin) to help with the pain and swelling. But people who have certain conditions or take certain medicines should not take ibuprofen. If you aren't sure whether you can take ibuprofen, ask your doctor or nurse.  Should I see a doctor or nurse? -- See your doctor or nurse right away if you have any of these symptoms with your black eye:  · Fever  · Any problems seeing, such as blurred vision or double vision  · Very bad pain in your eye, especially when trying to move it from side to side  · Bleeding from the eye  · Nausea and vomiting  · A slow heart rate  · Trouble breathing through your nose  · A nose that looks crooked  Will I need tests? -- Maybe. Most people don't need tests for a black eye, but your doctor might want you to have an eye exam. Plus, if you are having trouble seeing or moving your eye, your doctor might take a special kind of X-ray called a CT scan. The CT scan will show if any of the bones around your eye are broken. It can also show if you have damage to the eyeball itself.  All topics are updated as new evidence becomes available and our peer review process is " complete.  This topic retrieved from [employer] on: Sep 21, 2021.  Topic 91855 Version 6.0  Release: 29.4.2 - C29.263  © 2021 UpToDate, Inc. and/or its affiliates. All rights reserved.  Consumer Information Use and Disclaimer   This information is not specific medical advice and does not replace information you receive from your health care provider. This is only a brief summary of general information. It does NOT include all information about conditions, illnesses, injuries, tests, procedures, treatments, therapies, discharge instructions or life-style choices that may apply to you. You must talk with your health care provider for complete information about your health and treatment options. This information should not be used to decide whether or not to accept your health care provider's advice, instructions or recommendations. Only your health care provider has the knowledge and training to provide advice that is right for you. The use of this information is governed by the [employer] End User License Agreement, available at https://www.[employer]nex[employer].Aprilage/en/solutions/Eventifier/about/lissa.The use of [employer] content is governed by the [employer] Terms of Use. ©2021 UpToDate, Inc. All rights reserved.  Copyright   © 2021 UpToDate, Inc. and/or its affiliates. All rights reserved.

## 2022-01-21 NOTE — PROGRESS NOTES
Subjective:       Patient ID: Daniel Vogel is a 56 y.o. male.    Chief Complaint: Fall and ER follow up (Pt fell and hit his eye and rib cage )    Patient tripped and fell 1 week ago.  Hit his right eye on the floor.  Has a shinner on the right.  Some blurred vision.  Some sinus pressure.  He has been having a headache since his fall.  He went to the emergency room and CT scans were done which did not show any major abnormalities.  His vision is improving.    Review of Systems   Constitutional: Negative for activity change, chills, fatigue, fever and unexpected weight change.   HENT: Negative for sore throat and trouble swallowing.    Eyes: Positive for pain and visual disturbance. Negative for discharge.   Respiratory: Negative for cough, chest tightness and shortness of breath.    Cardiovascular: Positive for chest pain. Negative for leg swelling.   Gastrointestinal: Negative for abdominal pain.   Endocrine: Negative for cold intolerance and heat intolerance.   Genitourinary: Negative for difficulty urinating.   Musculoskeletal: Negative for back pain and joint swelling.   Skin: Negative for rash.   Neurological: Positive for headaches. Negative for dizziness, facial asymmetry and numbness.   Hematological: Negative for adenopathy.   Psychiatric/Behavioral: Negative for decreased concentration.       Objective:      Vitals:    01/21/22 1013   BP: 120/78   Pulse: 79   Resp: 14     Physical Exam  Constitutional:       Appearance: Normal appearance. He is well-developed and well-nourished.   HENT:      Head: Normocephalic and atraumatic.      Right Ear: Tympanic membrane, ear canal and external ear normal.      Left Ear: Tympanic membrane, ear canal and external ear normal.      Nose: Nose normal.      Mouth/Throat:      Mouth: Oropharynx is clear and moist.   Eyes:      Extraocular Movements: Extraocular movements intact and EOM normal.      Right eye: Normal extraocular motion and no nystagmus.      Left eye:  Normal extraocular motion and no nystagmus.      Conjunctiva/sclera: Conjunctivae normal.      Pupils: Pupils are equal, round, and reactive to light.      Funduscopic exam:     Right eye: No exudate or papilledema.         Left eye: No exudate or papilledema.      Comments: eccymosis over right eye lid   Neck:      Thyroid: No thyromegaly.      Trachea: No tracheal deviation.   Cardiovascular:      Rate and Rhythm: Normal rate and regular rhythm.      Pulses: Intact distal pulses.      Heart sounds: Normal heart sounds. No murmur heard.  No gallop.    Pulmonary:      Effort: Pulmonary effort is normal.      Breath sounds: Normal breath sounds.   Chest:      Chest wall: Tenderness (Over the right rib cage) present.   Abdominal:      Hernia: There is no hernia in the right inguinal area or left inguinal area.   Musculoskeletal:         General: Tenderness present. No edema. Normal range of motion.      Cervical back: Normal range of motion and neck supple.      Right lower leg: No edema.      Left lower leg: No edema.   Skin:     General: Skin is warm and dry.   Neurological:      General: No focal deficit present.      Mental Status: He is alert and oriented to person, place, and time.      Cranial Nerves: No cranial nerve deficit.      Gait: Gait normal.      Deep Tendon Reflexes: Reflexes are normal and symmetric.   Psychiatric:         Mood and Affect: Mood and affect and mood normal.         Behavior: Behavior normal.         Thought Content: Thought content normal.         Judgment: Judgment normal.         Assessment:       1. Concussion with loss of consciousness of 30 minutes or less, subsequent encounter    2. Ecchymosis of right eye, subsequent encounter        Plan:   Daniel was seen today for fall and er follow up.    Diagnoses and all orders for this visit:    Concussion with loss of consciousness of 30 minutes or less, subsequent encounter    Ecchymosis of right eye, subsequent encounter    Supportive  care.   See eye doctor in one week if vision does not improve  Tylenol or Advil for headaches  Handout for concussion treatment given to patient.  If headache does not resolve after 3 weeks, patient should return to clinic for further evaluation and treatment.

## 2022-04-18 ENCOUNTER — OFFICE VISIT (OUTPATIENT)
Dept: FAMILY MEDICINE | Facility: CLINIC | Age: 57
End: 2022-04-18
Payer: COMMERCIAL

## 2022-04-18 ENCOUNTER — CLINICAL SUPPORT (OUTPATIENT)
Dept: FAMILY MEDICINE | Facility: CLINIC | Age: 57
End: 2022-04-18
Payer: COMMERCIAL

## 2022-04-18 VITALS
DIASTOLIC BLOOD PRESSURE: 82 MMHG | HEART RATE: 86 BPM | SYSTOLIC BLOOD PRESSURE: 116 MMHG | RESPIRATION RATE: 16 BRPM | WEIGHT: 213.94 LBS | BODY MASS INDEX: 29.95 KG/M2 | HEIGHT: 71 IN

## 2022-04-18 DIAGNOSIS — M15.9 PRIMARY OSTEOARTHRITIS INVOLVING MULTIPLE JOINTS: ICD-10-CM

## 2022-04-18 DIAGNOSIS — E78.5 DYSLIPIDEMIA: ICD-10-CM

## 2022-04-18 DIAGNOSIS — E78.5 DYSLIPIDEMIA: Primary | ICD-10-CM

## 2022-04-18 LAB
ALBUMIN SERPL BCP-MCNC: 4.3 G/DL (ref 3.5–5.2)
ALP SERPL-CCNC: 59 U/L (ref 55–135)
ALT SERPL W/O P-5'-P-CCNC: 27 U/L (ref 10–44)
ANION GAP SERPL CALC-SCNC: 9 MMOL/L (ref 8–16)
AST SERPL-CCNC: 19 U/L (ref 10–40)
BILIRUB SERPL-MCNC: 0.5 MG/DL (ref 0.1–1)
BUN SERPL-MCNC: 17 MG/DL (ref 6–20)
CALCIUM SERPL-MCNC: 10 MG/DL (ref 8.7–10.5)
CHLORIDE SERPL-SCNC: 104 MMOL/L (ref 95–110)
CHOLEST SERPL-MCNC: 303 MG/DL (ref 120–199)
CHOLEST/HDLC SERPL: 8.7 {RATIO} (ref 2–5)
CO2 SERPL-SCNC: 27 MMOL/L (ref 23–29)
CREAT SERPL-MCNC: 1 MG/DL (ref 0.5–1.4)
EST. GFR  (AFRICAN AMERICAN): >60 ML/MIN/1.73 M^2
EST. GFR  (NON AFRICAN AMERICAN): >60 ML/MIN/1.73 M^2
GLUCOSE SERPL-MCNC: 84 MG/DL (ref 70–110)
HDLC SERPL-MCNC: 35 MG/DL (ref 40–75)
HDLC SERPL: 11.6 % (ref 20–50)
LDLC SERPL CALC-MCNC: ABNORMAL MG/DL (ref 63–159)
NONHDLC SERPL-MCNC: 268 MG/DL
POTASSIUM SERPL-SCNC: 4.5 MMOL/L (ref 3.5–5.1)
PROT SERPL-MCNC: 7.7 G/DL (ref 6–8.4)
SODIUM SERPL-SCNC: 140 MMOL/L (ref 136–145)
TRIGL SERPL-MCNC: 552 MG/DL (ref 30–150)

## 2022-04-18 PROCEDURE — 36415 PR COLLECTION VENOUS BLOOD,VENIPUNCTURE: ICD-10-PCS | Mod: S$GLB,,, | Performed by: FAMILY MEDICINE

## 2022-04-18 PROCEDURE — 80061 LIPID PANEL: CPT | Performed by: FAMILY MEDICINE

## 2022-04-18 PROCEDURE — 3074F PR MOST RECENT SYSTOLIC BLOOD PRESSURE < 130 MM HG: ICD-10-PCS | Mod: CPTII,S$GLB,, | Performed by: FAMILY MEDICINE

## 2022-04-18 PROCEDURE — 3079F PR MOST RECENT DIASTOLIC BLOOD PRESSURE 80-89 MM HG: ICD-10-PCS | Mod: CPTII,S$GLB,, | Performed by: FAMILY MEDICINE

## 2022-04-18 PROCEDURE — 3079F DIAST BP 80-89 MM HG: CPT | Mod: CPTII,S$GLB,, | Performed by: FAMILY MEDICINE

## 2022-04-18 PROCEDURE — 99213 PR OFFICE/OUTPT VISIT, EST, LEVL III, 20-29 MIN: ICD-10-PCS | Mod: S$GLB,,, | Performed by: FAMILY MEDICINE

## 2022-04-18 PROCEDURE — 3008F BODY MASS INDEX DOCD: CPT | Mod: CPTII,S$GLB,, | Performed by: FAMILY MEDICINE

## 2022-04-18 PROCEDURE — 1159F MED LIST DOCD IN RCRD: CPT | Mod: CPTII,S$GLB,, | Performed by: FAMILY MEDICINE

## 2022-04-18 PROCEDURE — 99213 OFFICE O/P EST LOW 20 MIN: CPT | Mod: S$GLB,,, | Performed by: FAMILY MEDICINE

## 2022-04-18 PROCEDURE — 1160F PR REVIEW ALL MEDS BY PRESCRIBER/CLIN PHARMACIST DOCUMENTED: ICD-10-PCS | Mod: CPTII,S$GLB,, | Performed by: FAMILY MEDICINE

## 2022-04-18 PROCEDURE — 3074F SYST BP LT 130 MM HG: CPT | Mod: CPTII,S$GLB,, | Performed by: FAMILY MEDICINE

## 2022-04-18 PROCEDURE — 99999 PR PBB SHADOW E&M-EST. PATIENT-LVL IV: ICD-10-PCS | Mod: PBBFAC,,, | Performed by: FAMILY MEDICINE

## 2022-04-18 PROCEDURE — 36415 COLL VENOUS BLD VENIPUNCTURE: CPT | Mod: S$GLB,,, | Performed by: FAMILY MEDICINE

## 2022-04-18 PROCEDURE — 99999 PR PBB SHADOW E&M-EST. PATIENT-LVL IV: CPT | Mod: PBBFAC,,, | Performed by: FAMILY MEDICINE

## 2022-04-18 PROCEDURE — 1160F RVW MEDS BY RX/DR IN RCRD: CPT | Mod: CPTII,S$GLB,, | Performed by: FAMILY MEDICINE

## 2022-04-18 PROCEDURE — 1159F PR MEDICATION LIST DOCUMENTED IN MEDICAL RECORD: ICD-10-PCS | Mod: CPTII,S$GLB,, | Performed by: FAMILY MEDICINE

## 2022-04-18 PROCEDURE — 3008F PR BODY MASS INDEX (BMI) DOCUMENTED: ICD-10-PCS | Mod: CPTII,S$GLB,, | Performed by: FAMILY MEDICINE

## 2022-04-18 PROCEDURE — 80053 COMPREHEN METABOLIC PANEL: CPT | Performed by: FAMILY MEDICINE

## 2022-04-18 RX ORDER — ROSUVASTATIN CALCIUM 20 MG/1
20 TABLET, COATED ORAL DAILY
Qty: 90 TABLET | Refills: 1 | Status: SHIPPED | OUTPATIENT
Start: 2022-04-18 | End: 2022-10-10 | Stop reason: SDUPTHER

## 2022-04-18 RX ORDER — CELECOXIB 200 MG/1
200 CAPSULE ORAL DAILY
Qty: 30 CAPSULE | Refills: 5 | Status: SHIPPED | OUTPATIENT
Start: 2022-04-18 | End: 2022-05-18

## 2022-04-18 NOTE — PROGRESS NOTES
Subjective:       Patient ID: Daniel Vogel is a 57 y.o. male.    Chief Complaint: Follow-up (6 month f/u )    Here for check up.  He has a history of an elevated PSA.  Sometimes he has some urinary hesitancy.  Patient is taking His statin every day for hyperlipidemia.  He is feeling well on the medication.  He denies side effects such as myalgias.      Review of Systems   Constitutional: Negative for activity change, chills, fatigue, fever and unexpected weight change.   HENT: Negative for sore throat and trouble swallowing.    Respiratory: Negative for cough, chest tightness and shortness of breath.    Cardiovascular: Negative for chest pain and leg swelling.   Gastrointestinal: Negative for abdominal pain.   Endocrine: Negative for cold intolerance and heat intolerance.   Genitourinary: Negative for difficulty urinating.   Musculoskeletal: Positive for back pain. Negative for joint swelling.   Skin: Negative for rash.   Neurological: Negative for numbness.   Hematological: Negative for adenopathy.   Psychiatric/Behavioral: Negative for decreased concentration.       Objective:      Vitals:    04/18/22 0838   BP: 116/82   Pulse: 86   Resp: 16     Physical Exam  Constitutional:       Appearance: He is well-developed.   HENT:      Head: Normocephalic and atraumatic.      Right Ear: Tympanic membrane, ear canal and external ear normal.      Left Ear: Tympanic membrane, ear canal and external ear normal.      Nose: Nose normal.   Eyes:      Conjunctiva/sclera: Conjunctivae normal.      Pupils: Pupils are equal, round, and reactive to light.   Neck:      Thyroid: No thyromegaly.      Trachea: No tracheal deviation.   Cardiovascular:      Rate and Rhythm: Normal rate and regular rhythm.      Heart sounds: Normal heart sounds. No murmur heard.    No gallop.   Pulmonary:      Effort: Pulmonary effort is normal.      Breath sounds: Normal breath sounds.   Abdominal:      General: Bowel sounds are normal. There is no  distension.      Palpations: Abdomen is soft. There is no mass.      Tenderness: There is no abdominal tenderness. There is no guarding or rebound.      Hernia: There is no hernia in the left inguinal area.   Genitourinary:     Prostate: Normal.      Rectum: Normal.   Musculoskeletal:         General: Normal range of motion.      Cervical back: Normal range of motion and neck supple.      Right lower leg: No edema.      Left lower leg: No edema.   Skin:     General: Skin is warm and dry.   Neurological:      General: No focal deficit present.      Mental Status: He is alert and oriented to person, place, and time.      Cranial Nerves: No cranial nerve deficit.      Deep Tendon Reflexes: Reflexes are normal and symmetric.   Psychiatric:         Behavior: Behavior normal.         Thought Content: Thought content normal.         Judgment: Judgment normal.           Lab Results   Component Value Date    LDLCALC Invalid, Trig>400.0 04/18/2022     PSA, Screen (ng/mL)   Date Value   04/15/2021 0.96     BMP  Lab Results   Component Value Date     04/18/2022    K 4.5 04/18/2022     04/18/2022    CO2 27 04/18/2022    BUN 17 04/18/2022    CREATININE 1.0 04/18/2022    CALCIUM 10.0 04/18/2022    ANIONGAP 9 04/18/2022    ESTGFRAFRICA >60 04/18/2022    EGFRNONAA >60 04/18/2022         Assessment:       1. Dyslipidemia    2. Primary osteoarthritis involving multiple joints        Plan:   Daniel was seen today for follow-up.    Diagnoses and all orders for this visit:    Dyslipidemia   Elevated cholesterol  Low fat diet.  Avoid sweets.  A 10 pound weight loss by the next visit as a  good goal.  Increase consumption of fruits and vegetables, fish and chicken.  Use medications below:  Lipitor 20 mg po daily  -     Comprehensive Metabolic Panel; Future    Benign prostatic hyperplasia without lower urinary tract symptoms  -     PSA, Screening; yearly    Diverticulosis of large intestine without hemorrhage  Watch  diet    Dyslipidemia  Low fat diet.  Avoid sweets.  A 10 pound weight loss by the next visit as a  good goal.  Increase consumption of fruits and vegetables, fish and chicken.  Use medications below:  -     Crestor 20 MG tablet; Take 1 tablet (20 mg total) by mouth once daily.  Dispense: 90 tablet; Refill: 1    Psoriasis  -     triamcinolone acetonide 0.1% (KENALOG) 0.1 % cream; Apply topically 2 (two) times daily.  Dispense: 45 g; Refill: 5    RTC in 6 months

## 2022-10-10 ENCOUNTER — CLINICAL SUPPORT (OUTPATIENT)
Dept: FAMILY MEDICINE | Facility: CLINIC | Age: 57
End: 2022-10-10
Payer: COMMERCIAL

## 2022-10-10 ENCOUNTER — OFFICE VISIT (OUTPATIENT)
Dept: FAMILY MEDICINE | Facility: CLINIC | Age: 57
End: 2022-10-10
Payer: COMMERCIAL

## 2022-10-10 VITALS
RESPIRATION RATE: 18 BRPM | BODY MASS INDEX: 30.86 KG/M2 | SYSTOLIC BLOOD PRESSURE: 130 MMHG | HEIGHT: 71 IN | HEART RATE: 53 BPM | WEIGHT: 220.44 LBS | DIASTOLIC BLOOD PRESSURE: 86 MMHG

## 2022-10-10 DIAGNOSIS — M15.9 PRIMARY OSTEOARTHRITIS INVOLVING MULTIPLE JOINTS: ICD-10-CM

## 2022-10-10 DIAGNOSIS — E78.5 DYSLIPIDEMIA: ICD-10-CM

## 2022-10-10 DIAGNOSIS — E78.5 DYSLIPIDEMIA: Primary | ICD-10-CM

## 2022-10-10 DIAGNOSIS — Z23 IMMUNIZATION DUE: ICD-10-CM

## 2022-10-10 LAB
ALBUMIN SERPL BCP-MCNC: 4.3 G/DL (ref 3.5–5.2)
ALP SERPL-CCNC: 48 U/L (ref 55–135)
ALT SERPL W/O P-5'-P-CCNC: 25 U/L (ref 10–44)
ANION GAP SERPL CALC-SCNC: 7 MMOL/L (ref 8–16)
AST SERPL-CCNC: 22 U/L (ref 10–40)
BILIRUB SERPL-MCNC: 0.6 MG/DL (ref 0.1–1)
BUN SERPL-MCNC: 15 MG/DL (ref 6–20)
CALCIUM SERPL-MCNC: 9.3 MG/DL (ref 8.7–10.5)
CHLORIDE SERPL-SCNC: 107 MMOL/L (ref 95–110)
CHOLEST SERPL-MCNC: 128 MG/DL (ref 120–199)
CHOLEST/HDLC SERPL: 3.7 {RATIO} (ref 2–5)
CO2 SERPL-SCNC: 25 MMOL/L (ref 23–29)
CREAT SERPL-MCNC: 1.1 MG/DL (ref 0.5–1.4)
EST. GFR  (NO RACE VARIABLE): >60 ML/MIN/1.73 M^2
GLUCOSE SERPL-MCNC: 103 MG/DL (ref 70–110)
HDLC SERPL-MCNC: 35 MG/DL (ref 40–75)
HDLC SERPL: 27.3 % (ref 20–50)
LDLC SERPL CALC-MCNC: 57.2 MG/DL (ref 63–159)
NONHDLC SERPL-MCNC: 93 MG/DL
POTASSIUM SERPL-SCNC: 4.9 MMOL/L (ref 3.5–5.1)
PROT SERPL-MCNC: 7 G/DL (ref 6–8.4)
SODIUM SERPL-SCNC: 139 MMOL/L (ref 136–145)
TRIGL SERPL-MCNC: 179 MG/DL (ref 30–150)

## 2022-10-10 PROCEDURE — 3079F PR MOST RECENT DIASTOLIC BLOOD PRESSURE 80-89 MM HG: ICD-10-PCS | Mod: CPTII,S$GLB,, | Performed by: FAMILY MEDICINE

## 2022-10-10 PROCEDURE — 90686 IIV4 VACC NO PRSV 0.5 ML IM: CPT | Mod: S$GLB,,, | Performed by: FAMILY MEDICINE

## 2022-10-10 PROCEDURE — 3079F DIAST BP 80-89 MM HG: CPT | Mod: CPTII,S$GLB,, | Performed by: FAMILY MEDICINE

## 2022-10-10 PROCEDURE — 36415 COLL VENOUS BLD VENIPUNCTURE: CPT | Mod: S$GLB,,, | Performed by: FAMILY MEDICINE

## 2022-10-10 PROCEDURE — 80061 LIPID PANEL: CPT | Performed by: FAMILY MEDICINE

## 2022-10-10 PROCEDURE — 3008F PR BODY MASS INDEX (BMI) DOCUMENTED: ICD-10-PCS | Mod: CPTII,S$GLB,, | Performed by: FAMILY MEDICINE

## 2022-10-10 PROCEDURE — 90471 IMMUNIZATION ADMIN: CPT | Mod: S$GLB,,, | Performed by: FAMILY MEDICINE

## 2022-10-10 PROCEDURE — 99999 PR PBB SHADOW E&M-EST. PATIENT-LVL III: ICD-10-PCS | Mod: PBBFAC,,, | Performed by: FAMILY MEDICINE

## 2022-10-10 PROCEDURE — 36415 PR COLLECTION VENOUS BLOOD,VENIPUNCTURE: ICD-10-PCS | Mod: S$GLB,,, | Performed by: FAMILY MEDICINE

## 2022-10-10 PROCEDURE — 3075F SYST BP GE 130 - 139MM HG: CPT | Mod: CPTII,S$GLB,, | Performed by: FAMILY MEDICINE

## 2022-10-10 PROCEDURE — 3008F BODY MASS INDEX DOCD: CPT | Mod: CPTII,S$GLB,, | Performed by: FAMILY MEDICINE

## 2022-10-10 PROCEDURE — 90471 FLU VACCINE (QUAD) GREATER THAN OR EQUAL TO 3YO PRESERVATIVE FREE IM: ICD-10-PCS | Mod: S$GLB,,, | Performed by: FAMILY MEDICINE

## 2022-10-10 PROCEDURE — 99999 PR PBB SHADOW E&M-EST. PATIENT-LVL III: CPT | Mod: PBBFAC,,, | Performed by: FAMILY MEDICINE

## 2022-10-10 PROCEDURE — 3075F PR MOST RECENT SYSTOLIC BLOOD PRESS GE 130-139MM HG: ICD-10-PCS | Mod: CPTII,S$GLB,, | Performed by: FAMILY MEDICINE

## 2022-10-10 PROCEDURE — 99213 PR OFFICE/OUTPT VISIT, EST, LEVL III, 20-29 MIN: ICD-10-PCS | Mod: 25,S$GLB,, | Performed by: FAMILY MEDICINE

## 2022-10-10 PROCEDURE — 99213 OFFICE O/P EST LOW 20 MIN: CPT | Mod: 25,S$GLB,, | Performed by: FAMILY MEDICINE

## 2022-10-10 PROCEDURE — 1159F MED LIST DOCD IN RCRD: CPT | Mod: CPTII,S$GLB,, | Performed by: FAMILY MEDICINE

## 2022-10-10 PROCEDURE — 1159F PR MEDICATION LIST DOCUMENTED IN MEDICAL RECORD: ICD-10-PCS | Mod: CPTII,S$GLB,, | Performed by: FAMILY MEDICINE

## 2022-10-10 PROCEDURE — 80053 COMPREHEN METABOLIC PANEL: CPT | Performed by: FAMILY MEDICINE

## 2022-10-10 PROCEDURE — 1160F PR REVIEW ALL MEDS BY PRESCRIBER/CLIN PHARMACIST DOCUMENTED: ICD-10-PCS | Mod: CPTII,S$GLB,, | Performed by: FAMILY MEDICINE

## 2022-10-10 PROCEDURE — 90686 FLU VACCINE (QUAD) GREATER THAN OR EQUAL TO 3YO PRESERVATIVE FREE IM: ICD-10-PCS | Mod: S$GLB,,, | Performed by: FAMILY MEDICINE

## 2022-10-10 PROCEDURE — 1160F RVW MEDS BY RX/DR IN RCRD: CPT | Mod: CPTII,S$GLB,, | Performed by: FAMILY MEDICINE

## 2022-10-10 RX ORDER — ROSUVASTATIN CALCIUM 20 MG/1
20 TABLET, COATED ORAL DAILY
Qty: 90 TABLET | Refills: 1 | Status: SHIPPED | OUTPATIENT
Start: 2022-10-10 | End: 2023-04-10 | Stop reason: SDUPTHER

## 2022-10-10 RX ORDER — CELECOXIB 100 MG/1
100 CAPSULE ORAL DAILY
Qty: 90 CAPSULE | Refills: 1 | Status: SHIPPED | OUTPATIENT
Start: 2022-10-10 | End: 2023-03-10 | Stop reason: SDUPTHER

## 2022-10-10 RX ORDER — CELECOXIB 100 MG/1
100 CAPSULE ORAL DAILY
COMMUNITY
End: 2022-10-10 | Stop reason: SDUPTHER

## 2022-10-10 NOTE — PROGRESS NOTES
Subjective:       Patient ID: Daniel Vogel is a 57 y.o. male.    Chief Complaint: Blood Sugar Problem    Here for check up.  He has a history of an elevated PSA.  Sometimes he has some urinary hesitancy.  Patient is taking His statin every day for hyperlipidemia.  He is feeling well on the medication.  He denies side effects such as myalgias.      Review of Systems   Constitutional:  Negative for activity change, chills, fatigue, fever and unexpected weight change.   HENT:  Negative for sore throat and trouble swallowing.    Respiratory:  Negative for cough, chest tightness and shortness of breath.    Cardiovascular:  Negative for chest pain and leg swelling.   Gastrointestinal:  Negative for abdominal pain.   Endocrine: Negative for cold intolerance and heat intolerance.   Genitourinary:  Negative for difficulty urinating.   Musculoskeletal:  Positive for back pain. Negative for joint swelling.   Skin:  Negative for rash.   Neurological:  Negative for numbness.   Hematological:  Negative for adenopathy.   Psychiatric/Behavioral:  Negative for decreased concentration.      Objective:      Vitals:    10/10/22 0826   BP: 130/86   Pulse: (!) 53   Resp: 18     Physical Exam  Constitutional:       Appearance: He is well-developed.   HENT:      Head: Normocephalic and atraumatic.      Right Ear: Tympanic membrane, ear canal and external ear normal.      Left Ear: Tympanic membrane, ear canal and external ear normal.      Nose: Nose normal.   Eyes:      Conjunctiva/sclera: Conjunctivae normal.      Pupils: Pupils are equal, round, and reactive to light.   Neck:      Thyroid: No thyromegaly.      Trachea: No tracheal deviation.   Cardiovascular:      Rate and Rhythm: Normal rate and regular rhythm.      Heart sounds: Normal heart sounds. No murmur heard.    No gallop.   Pulmonary:      Effort: Pulmonary effort is normal.      Breath sounds: Normal breath sounds.   Abdominal:      General: Bowel sounds are normal. There  is no distension.      Palpations: Abdomen is soft. There is no mass.      Tenderness: There is no abdominal tenderness. There is no guarding or rebound.      Hernia: There is no hernia in the left inguinal area.   Genitourinary:     Prostate: Normal.      Rectum: Normal.   Musculoskeletal:         General: Normal range of motion.      Cervical back: Normal range of motion and neck supple.      Right lower leg: No edema.      Left lower leg: No edema.   Skin:     General: Skin is warm and dry.   Neurological:      General: No focal deficit present.      Mental Status: He is alert and oriented to person, place, and time.      Cranial Nerves: No cranial nerve deficit.      Deep Tendon Reflexes: Reflexes are normal and symmetric.   Psychiatric:         Behavior: Behavior normal.         Thought Content: Thought content normal.         Judgment: Judgment normal.         Lab Results   Component Value Date    LDLCALC 57.2 (L) 10/10/2022     PSA, Screen (ng/mL)   Date Value   04/15/2021 0.96     BMP  Lab Results   Component Value Date     10/10/2022    K 4.9 10/10/2022     10/10/2022    CO2 25 10/10/2022    BUN 15 10/10/2022    CREATININE 1.1 10/10/2022    CALCIUM 9.3 10/10/2022    ANIONGAP 7 (L) 10/10/2022    ESTGFRAFRICA >60 04/18/2022    EGFRNONAA >60 04/18/2022       Assessment:       1. Dyslipidemia    2. Primary osteoarthritis involving multiple joints    3. Immunization due          Plan:   Daniel was seen today for follow-up.    Diagnoses and all orders for this visit:    Dyslipidemia   Elevated cholesterol  Low fat diet.  Avoid sweets.  A 10 pound weight loss by the next visit as a  good goal.  Increase consumption of fruits and vegetables, fish and chicken.  Use medications below:  Crestor 20 mg po daily  -     Comprehensive Metabolic Panel; Future    Benign prostatic hyperplasia without lower urinary tract symptoms  PSA, Screening; yearly    Diverticulosis of large intestine without hemorrhage  Watch  diet    Dyslipidemia  Low fat diet.  Avoid sweets.  A 10 pound weight loss by the next visit as a  good goal.  Increase consumption of fruits and vegetables, fish and chicken.  Use medications below:  -     Crestor 20 MG tablet; Take 1 tablet (20 mg total) by mouth once daily.  Dispense: 90 tablet; Refill: 1    Psoriasis  -     triamcinolone acetonide 0.1% (KENALOG) 0.1 % cream; Apply topically 2 (two) times daily.  Dispense: 45 g; Refill: 5    1. Dyslipidemia  Assessment & Plan:  Low fat diet.  Avoid sweets.  A 10 pound weight loss by the next visit as a  good goal.  Increase consumption of fruits and vegetables, fish and chicken.  Use medications below:    Orders:  -     Comprehensive Metabolic Panel; Future; Expected date: 10/10/2022  -     Lipid Panel; Future; Expected date: 10/10/2022  -     rosuvastatin (CRESTOR) 20 MG tablet; Take 1 tablet (20 mg total) by mouth once daily.  Dispense: 90 tablet; Refill: 1    2. Primary osteoarthritis involving multiple joints  -     celecoxib (CELEBREX) 100 MG capsule; Take 1 capsule (100 mg total) by mouth once daily.  Dispense: 90 capsule; Refill: 1    3. Immunization due  -     Influenza - Quadrivalent *Preferred* (6 months+) (PF)     RTC in 6 months

## 2022-10-11 NOTE — ASSESSMENT & PLAN NOTE
Low fat diet.  Avoid sweets.  A 10 pound weight loss by the next visit as a  good goal.  Increase consumption of fruits and vegetables, fish and chicken.  Use medications below:

## 2023-02-15 ENCOUNTER — OFFICE VISIT (OUTPATIENT)
Dept: URGENT CARE | Facility: CLINIC | Age: 58
End: 2023-02-15
Payer: COMMERCIAL

## 2023-02-15 VITALS
DIASTOLIC BLOOD PRESSURE: 79 MMHG | BODY MASS INDEX: 30.8 KG/M2 | HEIGHT: 71 IN | RESPIRATION RATE: 17 BRPM | WEIGHT: 220 LBS | HEART RATE: 65 BPM | SYSTOLIC BLOOD PRESSURE: 147 MMHG | TEMPERATURE: 98 F | OXYGEN SATURATION: 97 %

## 2023-02-15 DIAGNOSIS — R05.9 COUGH, UNSPECIFIED TYPE: Primary | ICD-10-CM

## 2023-02-15 DIAGNOSIS — J32.9 SINUSITIS, UNSPECIFIED CHRONICITY, UNSPECIFIED LOCATION: ICD-10-CM

## 2023-02-15 LAB
CTP QC/QA: YES
SARS-COV-2 AG RESP QL IA.RAPID: NEGATIVE

## 2023-02-15 PROCEDURE — 99213 OFFICE O/P EST LOW 20 MIN: CPT | Mod: S$GLB,,, | Performed by: PHYSICIAN ASSISTANT

## 2023-02-15 PROCEDURE — 1159F PR MEDICATION LIST DOCUMENTED IN MEDICAL RECORD: ICD-10-PCS | Mod: CPTII,S$GLB,, | Performed by: PHYSICIAN ASSISTANT

## 2023-02-15 PROCEDURE — 3078F DIAST BP <80 MM HG: CPT | Mod: CPTII,S$GLB,, | Performed by: PHYSICIAN ASSISTANT

## 2023-02-15 PROCEDURE — 87811 SARS-COV-2 COVID19 W/OPTIC: CPT | Mod: QW,S$GLB,, | Performed by: PHYSICIAN ASSISTANT

## 2023-02-15 PROCEDURE — 3077F PR MOST RECENT SYSTOLIC BLOOD PRESSURE >= 140 MM HG: ICD-10-PCS | Mod: CPTII,S$GLB,, | Performed by: PHYSICIAN ASSISTANT

## 2023-02-15 PROCEDURE — 3078F PR MOST RECENT DIASTOLIC BLOOD PRESSURE < 80 MM HG: ICD-10-PCS | Mod: CPTII,S$GLB,, | Performed by: PHYSICIAN ASSISTANT

## 2023-02-15 PROCEDURE — 3008F PR BODY MASS INDEX (BMI) DOCUMENTED: ICD-10-PCS | Mod: CPTII,S$GLB,, | Performed by: PHYSICIAN ASSISTANT

## 2023-02-15 PROCEDURE — 1159F MED LIST DOCD IN RCRD: CPT | Mod: CPTII,S$GLB,, | Performed by: PHYSICIAN ASSISTANT

## 2023-02-15 PROCEDURE — 3077F SYST BP >= 140 MM HG: CPT | Mod: CPTII,S$GLB,, | Performed by: PHYSICIAN ASSISTANT

## 2023-02-15 PROCEDURE — 1160F PR REVIEW ALL MEDS BY PRESCRIBER/CLIN PHARMACIST DOCUMENTED: ICD-10-PCS | Mod: CPTII,S$GLB,, | Performed by: PHYSICIAN ASSISTANT

## 2023-02-15 PROCEDURE — 99213 PR OFFICE/OUTPT VISIT, EST, LEVL III, 20-29 MIN: ICD-10-PCS | Mod: S$GLB,,, | Performed by: PHYSICIAN ASSISTANT

## 2023-02-15 PROCEDURE — 87811 SARS CORONAVIRUS 2 ANTIGEN POCT, MANUAL READ: ICD-10-PCS | Mod: QW,S$GLB,, | Performed by: PHYSICIAN ASSISTANT

## 2023-02-15 PROCEDURE — 1160F RVW MEDS BY RX/DR IN RCRD: CPT | Mod: CPTII,S$GLB,, | Performed by: PHYSICIAN ASSISTANT

## 2023-02-15 PROCEDURE — 3008F BODY MASS INDEX DOCD: CPT | Mod: CPTII,S$GLB,, | Performed by: PHYSICIAN ASSISTANT

## 2023-02-15 RX ORDER — BENZONATATE 100 MG/1
100 CAPSULE ORAL 3 TIMES DAILY PRN
Qty: 21 CAPSULE | Refills: 0 | Status: SHIPPED | OUTPATIENT
Start: 2023-02-15 | End: 2023-02-25

## 2023-02-15 RX ORDER — CETIRIZINE HYDROCHLORIDE 10 MG/1
10 TABLET ORAL DAILY
Qty: 30 TABLET | Refills: 1 | Status: SHIPPED | OUTPATIENT
Start: 2023-02-15 | End: 2024-02-15

## 2023-02-15 RX ORDER — FLUTICASONE PROPIONATE 50 MCG
1 SPRAY, SUSPENSION (ML) NASAL DAILY
Qty: 16 G | Refills: 3 | Status: SHIPPED | OUTPATIENT
Start: 2023-02-15

## 2023-02-15 RX ORDER — PREDNISONE 20 MG/1
20 TABLET ORAL DAILY
Qty: 4 TABLET | Refills: 0 | Status: SHIPPED | OUTPATIENT
Start: 2023-02-15 | End: 2023-10-09

## 2023-02-15 RX ORDER — CROMOLYN SODIUM 5.2 MG/ML
1 SPRAY, METERED NASAL 4 TIMES DAILY
Qty: 26 ML | Refills: 1 | Status: SHIPPED | OUTPATIENT
Start: 2023-02-15

## 2023-02-15 NOTE — PROGRESS NOTES
"Subjective:       Patient ID: Daniel Vogel is a 57 y.o. male.    Vitals:  height is 5' 11" (1.803 m) and weight is 99.8 kg (220 lb). His oral temperature is 98.1 °F (36.7 °C). His blood pressure is 147/79 (abnormal) and his pulse is 65. His respiration is 17 and oxygen saturation is 97%.     Chief Complaint: Sinus Problem    Patient presents with a cough , nasal congestion and a sinus drip Symptom onset 2 days ago. Patient denies fever and reports no other symptoms . Patient has been taking otc meds for relief.     Sinus Problem  This is a new problem. Episode onset: 2 days ago. The problem is unchanged. There has been no fever. His pain is at a severity of 7/10. The pain is moderate. Associated symptoms include congestion, coughing and sinus pressure. Pertinent negatives include no sneezing or sore throat. Treatments tried: otc cold and flu. The treatment provided mild relief.     HENT:  Positive for congestion and sinus pressure. Negative for sore throat.    Respiratory:  Positive for cough.    Skin:  Negative for erythema.   Allergic/Immunologic: Negative for sneezing.     Objective:      Physical Exam   Constitutional: He is oriented to person, place, and time. He appears well-developed. He is cooperative.  Non-toxic appearance. He does not appear ill. No distress.   HENT:   Head: Normocephalic and atraumatic.   Ears:   Right Ear: Hearing, tympanic membrane, external ear and ear canal normal.   Left Ear: Hearing, tympanic membrane, external ear and ear canal normal.   Nose: Nose normal. No mucosal edema, rhinorrhea or nasal deformity. No epistaxis. Right sinus exhibits no maxillary sinus tenderness and no frontal sinus tenderness. Left sinus exhibits no maxillary sinus tenderness and no frontal sinus tenderness.   Mouth/Throat: Uvula is midline, oropharynx is clear and moist and mucous membranes are normal. No trismus in the jaw. Normal dentition. No uvula swelling. No oropharyngeal exudate, posterior " oropharyngeal edema or posterior oropharyngeal erythema.   Eyes: Conjunctivae, EOM and lids are normal. Pupils are equal, round, and reactive to light. Right eye exhibits no discharge. Left eye exhibits no discharge. No scleral icterus.   Neck: Trachea normal and phonation normal. Neck supple. No JVD present. No tracheal deviation present. No thyromegaly present. No edema present. No erythema present. No neck rigidity present.   Cardiovascular: Normal rate, regular rhythm, normal heart sounds and normal pulses.   No murmur heard.Exam reveals no gallop and no friction rub.   Pulmonary/Chest: Effort normal and breath sounds normal. No stridor. No respiratory distress. He has no decreased breath sounds. He has no wheezes. He has no rhonchi. He has no rales. He exhibits no tenderness.   Abdominal: Normal appearance. He exhibits no distension. Soft. There is no abdominal tenderness. There is no rebound and no guarding.   Musculoskeletal: Normal range of motion.         General: No deformity. Normal range of motion.   Neurological: He is alert and oriented to person, place, and time. He exhibits normal muscle tone. Coordination normal.   Skin: Skin is warm, dry, intact, not diaphoretic, not pale and no rash. Capillary refill takes less than 2 seconds. No erythema   Psychiatric: His speech is normal and behavior is normal. Judgment and thought content normal.   Nursing note and vitals reviewed.      Results for orders placed or performed in visit on 02/15/23   SARS Coronavirus 2 Antigen, POCT Manual Read   Result Value Ref Range    SARS Coronavirus 2 Antigen Negative Negative     Acceptable Yes     No results found.   Assessment:       1. Cough, unspecified type    2. Sinusitis, unspecified chronicity, unspecified location          Plan:         Cough, unspecified type  -     SARS Coronavirus 2 Antigen, POCT Manual Read  -     benzonatate (TESSALON) 100 MG capsule; Take 1 capsule (100 mg total) by mouth 3  (three) times daily as needed for Cough.  Dispense: 21 capsule; Refill: 0    Sinusitis, unspecified chronicity, unspecified location  -     fluticasone propionate (FLONASE) 50 mcg/actuation nasal spray; 1 spray (50 mcg total) by Each Nostril route once daily.  Dispense: 16 g; Refill: 3  -     cetirizine (ZYRTEC) 10 MG tablet; Take 1 tablet (10 mg total) by mouth once daily.  Dispense: 30 tablet; Refill: 1  -     predniSONE (DELTASONE) 20 MG tablet; Take 1 tablet (20 mg total) by mouth once daily.  Dispense: 4 tablet; Refill: 0  -     cromolyn (NASALCHROM) 5.2 mg/spray (4 %) nasal spray; 1 spray by Nasal route 4 (four) times daily.  Dispense: 26 mL; Refill: 1    Follow up if symptoms worsen or fail to improve, for F/U with PCP or ED. There are no Patient Instructions on file for this visit.

## 2023-04-10 ENCOUNTER — OFFICE VISIT (OUTPATIENT)
Dept: FAMILY MEDICINE | Facility: CLINIC | Age: 58
End: 2023-04-10
Payer: COMMERCIAL

## 2023-04-10 ENCOUNTER — CLINICAL SUPPORT (OUTPATIENT)
Dept: FAMILY MEDICINE | Facility: CLINIC | Age: 58
End: 2023-04-10
Payer: COMMERCIAL

## 2023-04-10 VITALS
WEIGHT: 218.38 LBS | HEART RATE: 49 BPM | OXYGEN SATURATION: 98 % | BODY MASS INDEX: 30.57 KG/M2 | SYSTOLIC BLOOD PRESSURE: 115 MMHG | HEIGHT: 71 IN | DIASTOLIC BLOOD PRESSURE: 78 MMHG

## 2023-04-10 DIAGNOSIS — Z12.5 SCREENING FOR PROSTATE CANCER: ICD-10-CM

## 2023-04-10 DIAGNOSIS — Z12.5 SCREENING FOR PROSTATE CANCER: Primary | ICD-10-CM

## 2023-04-10 DIAGNOSIS — E78.5 DYSLIPIDEMIA: ICD-10-CM

## 2023-04-10 DIAGNOSIS — M15.9 PRIMARY OSTEOARTHRITIS INVOLVING MULTIPLE JOINTS: ICD-10-CM

## 2023-04-10 LAB
ALBUMIN SERPL BCP-MCNC: 4.3 G/DL (ref 3.5–5.2)
ALP SERPL-CCNC: 52 U/L (ref 55–135)
ALT SERPL W/O P-5'-P-CCNC: 30 U/L (ref 10–44)
ANION GAP SERPL CALC-SCNC: 10 MMOL/L (ref 8–16)
AST SERPL-CCNC: 24 U/L (ref 10–40)
BILIRUB SERPL-MCNC: 0.5 MG/DL (ref 0.1–1)
BUN SERPL-MCNC: 15 MG/DL (ref 6–20)
CALCIUM SERPL-MCNC: 9.4 MG/DL (ref 8.7–10.5)
CHLORIDE SERPL-SCNC: 106 MMOL/L (ref 95–110)
CHOLEST SERPL-MCNC: 170 MG/DL (ref 120–199)
CHOLEST/HDLC SERPL: 4.9 {RATIO} (ref 2–5)
CO2 SERPL-SCNC: 23 MMOL/L (ref 23–29)
COMPLEXED PSA SERPL-MCNC: 0.55 NG/ML (ref 0–4)
CREAT SERPL-MCNC: 1.1 MG/DL (ref 0.5–1.4)
EST. GFR  (NO RACE VARIABLE): >60 ML/MIN/1.73 M^2
GLUCOSE SERPL-MCNC: 95 MG/DL (ref 70–110)
HDLC SERPL-MCNC: 35 MG/DL (ref 40–75)
HDLC SERPL: 20.6 % (ref 20–50)
LDLC SERPL CALC-MCNC: 96.4 MG/DL (ref 63–159)
NONHDLC SERPL-MCNC: 135 MG/DL
POTASSIUM SERPL-SCNC: 4.7 MMOL/L (ref 3.5–5.1)
PROT SERPL-MCNC: 7.4 G/DL (ref 6–8.4)
SODIUM SERPL-SCNC: 139 MMOL/L (ref 136–145)
TRIGL SERPL-MCNC: 193 MG/DL (ref 30–150)

## 2023-04-10 PROCEDURE — 36415 PR COLLECTION VENOUS BLOOD,VENIPUNCTURE: ICD-10-PCS | Mod: S$GLB,,, | Performed by: FAMILY MEDICINE

## 2023-04-10 PROCEDURE — 1160F PR REVIEW ALL MEDS BY PRESCRIBER/CLIN PHARMACIST DOCUMENTED: ICD-10-PCS | Mod: CPTII,S$GLB,, | Performed by: FAMILY MEDICINE

## 2023-04-10 PROCEDURE — 3074F SYST BP LT 130 MM HG: CPT | Mod: CPTII,S$GLB,, | Performed by: FAMILY MEDICINE

## 2023-04-10 PROCEDURE — 3008F BODY MASS INDEX DOCD: CPT | Mod: CPTII,S$GLB,, | Performed by: FAMILY MEDICINE

## 2023-04-10 PROCEDURE — 36415 COLL VENOUS BLD VENIPUNCTURE: CPT | Mod: S$GLB,,, | Performed by: FAMILY MEDICINE

## 2023-04-10 PROCEDURE — 3078F DIAST BP <80 MM HG: CPT | Mod: CPTII,S$GLB,, | Performed by: FAMILY MEDICINE

## 2023-04-10 PROCEDURE — 99214 PR OFFICE/OUTPT VISIT, EST, LEVL IV, 30-39 MIN: ICD-10-PCS | Mod: S$GLB,,, | Performed by: FAMILY MEDICINE

## 2023-04-10 PROCEDURE — 99999 PR PBB SHADOW E&M-EST. PATIENT-LVL III: CPT | Mod: PBBFAC,,, | Performed by: FAMILY MEDICINE

## 2023-04-10 PROCEDURE — 99214 OFFICE O/P EST MOD 30 MIN: CPT | Mod: S$GLB,,, | Performed by: FAMILY MEDICINE

## 2023-04-10 PROCEDURE — 3008F PR BODY MASS INDEX (BMI) DOCUMENTED: ICD-10-PCS | Mod: CPTII,S$GLB,, | Performed by: FAMILY MEDICINE

## 2023-04-10 PROCEDURE — 1159F PR MEDICATION LIST DOCUMENTED IN MEDICAL RECORD: ICD-10-PCS | Mod: CPTII,S$GLB,, | Performed by: FAMILY MEDICINE

## 2023-04-10 PROCEDURE — 3078F PR MOST RECENT DIASTOLIC BLOOD PRESSURE < 80 MM HG: ICD-10-PCS | Mod: CPTII,S$GLB,, | Performed by: FAMILY MEDICINE

## 2023-04-10 PROCEDURE — 1159F MED LIST DOCD IN RCRD: CPT | Mod: CPTII,S$GLB,, | Performed by: FAMILY MEDICINE

## 2023-04-10 PROCEDURE — 84153 ASSAY OF PSA TOTAL: CPT | Performed by: FAMILY MEDICINE

## 2023-04-10 PROCEDURE — 3074F PR MOST RECENT SYSTOLIC BLOOD PRESSURE < 130 MM HG: ICD-10-PCS | Mod: CPTII,S$GLB,, | Performed by: FAMILY MEDICINE

## 2023-04-10 PROCEDURE — 80053 COMPREHEN METABOLIC PANEL: CPT | Performed by: FAMILY MEDICINE

## 2023-04-10 PROCEDURE — 80061 LIPID PANEL: CPT | Performed by: FAMILY MEDICINE

## 2023-04-10 PROCEDURE — 1160F RVW MEDS BY RX/DR IN RCRD: CPT | Mod: CPTII,S$GLB,, | Performed by: FAMILY MEDICINE

## 2023-04-10 PROCEDURE — 99999 PR PBB SHADOW E&M-EST. PATIENT-LVL III: ICD-10-PCS | Mod: PBBFAC,,, | Performed by: FAMILY MEDICINE

## 2023-04-10 RX ORDER — ROSUVASTATIN CALCIUM 20 MG/1
20 TABLET, COATED ORAL DAILY
Qty: 90 TABLET | Refills: 1 | Status: SHIPPED | OUTPATIENT
Start: 2023-04-10 | End: 2023-10-06

## 2023-04-10 RX ORDER — CELECOXIB 200 MG/1
200 CAPSULE ORAL DAILY
Qty: 90 CAPSULE | Refills: 1 | Status: SHIPPED | OUTPATIENT
Start: 2023-04-10 | End: 2023-10-09 | Stop reason: SDUPTHER

## 2023-04-10 NOTE — PROGRESS NOTES
Subjective:       Patient ID: Daniel Vogel is a 58 y.o. male.    Chief Complaint: Follow-up      Here for check up.  He has a history of an elevated PSA.  Sometimes he has some urinary hesitancy.  Patient is taking His statin every day for hyperlipidemia.  He is feeling well on the medication.  He denies side effects such as myalgias.  Has arthritis and psoriasis.      Review of Systems   Constitutional:  Negative for activity change, chills, fatigue, fever and unexpected weight change.   HENT:  Negative for sore throat and trouble swallowing.    Respiratory:  Negative for cough, chest tightness and shortness of breath.    Cardiovascular:  Negative for chest pain and leg swelling.   Gastrointestinal:  Negative for abdominal pain.   Endocrine: Negative for cold intolerance and heat intolerance.   Genitourinary:  Negative for difficulty urinating.   Musculoskeletal:  Positive for back pain. Negative for joint swelling.   Skin:  Negative for rash.   Neurological:  Negative for numbness.   Hematological:  Negative for adenopathy.   Psychiatric/Behavioral:  Negative for decreased concentration.      Objective:      Vitals:    04/10/23 0834   BP: 115/78   Pulse: (!) 49     Physical Exam  Constitutional:       Appearance: He is well-developed.   HENT:      Head: Normocephalic and atraumatic.      Right Ear: Tympanic membrane, ear canal and external ear normal.      Left Ear: Tympanic membrane, ear canal and external ear normal.      Nose: Nose normal.   Eyes:      Conjunctiva/sclera: Conjunctivae normal.      Pupils: Pupils are equal, round, and reactive to light.   Neck:      Thyroid: No thyromegaly.      Trachea: No tracheal deviation.   Cardiovascular:      Rate and Rhythm: Normal rate and regular rhythm.      Heart sounds: Normal heart sounds. No murmur heard.    No gallop.   Pulmonary:      Effort: Pulmonary effort is normal.      Breath sounds: Normal breath sounds.   Abdominal:      General: Bowel sounds are  normal. There is no distension.      Palpations: Abdomen is soft. There is no mass.      Tenderness: There is no abdominal tenderness. There is no guarding or rebound.      Hernia: There is no hernia in the left inguinal area.   Genitourinary:     Prostate: Normal.      Rectum: Normal.   Musculoskeletal:         General: Normal range of motion.      Cervical back: Normal range of motion and neck supple.      Right lower leg: No edema.      Left lower leg: No edema.   Skin:     General: Skin is warm and dry.   Neurological:      General: No focal deficit present.      Mental Status: He is alert and oriented to person, place, and time.      Cranial Nerves: No cranial nerve deficit.      Deep Tendon Reflexes: Reflexes are normal and symmetric.   Psychiatric:         Behavior: Behavior normal.         Thought Content: Thought content normal.         Judgment: Judgment normal.         Lab Results   Component Value Date    LDLCALC 96.4 04/10/2023     PSA, Screen (ng/mL)   Date Value   04/10/2023 0.55     BMP  Lab Results   Component Value Date     04/10/2023    K 4.7 04/10/2023     04/10/2023    CO2 23 04/10/2023    BUN 15 04/10/2023    CREATININE 1.1 04/10/2023    CALCIUM 9.4 04/10/2023    ANIONGAP 10 04/10/2023    ESTGFRAFRICA >60 04/18/2022    EGFRNONAA >60 04/18/2022       Assessment:       1. Screening for prostate cancer    2. Dyslipidemia    3. Primary osteoarthritis involving multiple joints          Plan:   Daniel was seen today for follow-up.    Diagnoses and all orders for this visit:    Dyslipidemia   Elevated cholesterol  Low fat diet.  Avoid sweets.  A 10 pound weight loss by the next visit as a  good goal.  Increase consumption of fruits and vegetables, fish and chicken.  Use medications below:  Crestor 20 mg po daily    Benign prostatic hyperplasia without lower urinary tract symptoms  PSA, Screening; yearly    Diverticulosis of large intestine without hemorrhage  Watch diet     Psoriasis  -      triamcinolone acetonide 0.1% (KENALOG) 0.1 % cream; Apply topically 2 (two) times daily.      1. Screening for prostate cancer  -     PSA, Screening; Future; Expected date: 04/10/2023    2. Dyslipidemia  Overview:  Elevated cholesterol  Low fat diet.  Avoid sweets.  A 10 pound weight loss by the next visit as a  good goal.  Increase consumption of fruits and vegetables, fish and chicken.  Use medications below:  Crestor 20 mg po daily    Orders:  -     rosuvastatin (CRESTOR) 20 MG tablet; Take 1 tablet (20 mg total) by mouth once daily.  Dispense: 90 tablet; Refill: 1  -     Comprehensive Metabolic Panel; Future; Expected date: 04/10/2023  -     Lipid Panel; Future; Expected date: 04/10/2023    3. Primary osteoarthritis involving multiple joints  -     celecoxib (CELEBREX) 200 MG capsule; Take 1 capsule (200 mg total) by mouth once daily.  Dispense: 90 capsule; Refill: 1       RTC in 6 months

## 2023-04-11 ENCOUNTER — PATIENT MESSAGE (OUTPATIENT)
Dept: RADIOLOGY | Facility: CLINIC | Age: 58
End: 2023-04-11
Payer: COMMERCIAL

## 2023-04-11 NOTE — PROGRESS NOTES
Tell patient that labs look good.  Cholesterol could be better.  Be sure to take the Crestor every day

## 2023-10-06 DIAGNOSIS — E78.5 DYSLIPIDEMIA: ICD-10-CM

## 2023-10-06 RX ORDER — ROSUVASTATIN CALCIUM 20 MG/1
20 TABLET, COATED ORAL
Qty: 90 TABLET | Refills: 1 | Status: SHIPPED | OUTPATIENT
Start: 2023-10-06

## 2023-10-06 NOTE — TELEPHONE ENCOUNTER
Refill Decision Note   Daniel Vogel  is requesting a refill authorization.  Brief Assessment and Rationale for Refill:  Approve     Medication Therapy Plan:       Medication Reconciliation Completed: No   Comments:     No Care Gaps recommended.     Note composed:11:25 AM 10/06/2023

## 2023-10-06 NOTE — TELEPHONE ENCOUNTER
No care due was identified.  [facility] Embedded Care Due Messages. Reference number: [id number].   10/06/2023 11:06:18 AM CDT

## 2023-10-09 ENCOUNTER — CLINICAL SUPPORT (OUTPATIENT)
Dept: FAMILY MEDICINE | Facility: CLINIC | Age: 58
End: 2023-10-09
Payer: COMMERCIAL

## 2023-10-09 ENCOUNTER — OFFICE VISIT (OUTPATIENT)
Dept: FAMILY MEDICINE | Facility: CLINIC | Age: 58
End: 2023-10-09
Payer: COMMERCIAL

## 2023-10-09 VITALS
SYSTOLIC BLOOD PRESSURE: 122 MMHG | DIASTOLIC BLOOD PRESSURE: 80 MMHG | HEIGHT: 71 IN | OXYGEN SATURATION: 97 % | BODY MASS INDEX: 30.76 KG/M2 | WEIGHT: 219.69 LBS | RESPIRATION RATE: 18 BRPM | HEART RATE: 57 BPM

## 2023-10-09 DIAGNOSIS — E78.5 DYSLIPIDEMIA: ICD-10-CM

## 2023-10-09 DIAGNOSIS — Z23 IMMUNIZATION DUE: ICD-10-CM

## 2023-10-09 DIAGNOSIS — E78.5 DYSLIPIDEMIA: Primary | ICD-10-CM

## 2023-10-09 DIAGNOSIS — M15.9 PRIMARY OSTEOARTHRITIS INVOLVING MULTIPLE JOINTS: ICD-10-CM

## 2023-10-09 PROBLEM — M15.0 PRIMARY OSTEOARTHRITIS INVOLVING MULTIPLE JOINTS: Status: ACTIVE | Noted: 2023-10-09

## 2023-10-09 LAB
ALBUMIN SERPL BCP-MCNC: 4.5 G/DL (ref 3.5–5.2)
ALP SERPL-CCNC: 53 U/L (ref 55–135)
ALT SERPL W/O P-5'-P-CCNC: 31 U/L (ref 10–44)
ANION GAP SERPL CALC-SCNC: 12 MMOL/L (ref 8–16)
AST SERPL-CCNC: 22 U/L (ref 10–40)
BASOPHILS # BLD AUTO: 0.06 K/UL (ref 0–0.2)
BASOPHILS NFR BLD: 1.3 % (ref 0–1.9)
BILIRUB SERPL-MCNC: 0.6 MG/DL (ref 0.1–1)
BUN SERPL-MCNC: 14 MG/DL (ref 6–20)
CALCIUM SERPL-MCNC: 9.7 MG/DL (ref 8.7–10.5)
CHLORIDE SERPL-SCNC: 106 MMOL/L (ref 95–110)
CHOLEST SERPL-MCNC: 143 MG/DL (ref 120–199)
CHOLEST/HDLC SERPL: 4.1 {RATIO} (ref 2–5)
CO2 SERPL-SCNC: 24 MMOL/L (ref 23–29)
CREAT SERPL-MCNC: 1.1 MG/DL (ref 0.5–1.4)
DIFFERENTIAL METHOD: ABNORMAL
EOSINOPHIL # BLD AUTO: 0.2 K/UL (ref 0–0.5)
EOSINOPHIL NFR BLD: 3.8 % (ref 0–8)
ERYTHROCYTE [DISTWIDTH] IN BLOOD BY AUTOMATED COUNT: 12.9 % (ref 11.5–14.5)
EST. GFR  (NO RACE VARIABLE): >60 ML/MIN/1.73 M^2
GLUCOSE SERPL-MCNC: 101 MG/DL (ref 70–110)
HCT VFR BLD AUTO: 42.6 % (ref 40–54)
HDLC SERPL-MCNC: 35 MG/DL (ref 40–75)
HDLC SERPL: 24.5 % (ref 20–50)
HGB BLD-MCNC: 14.5 G/DL (ref 14–18)
IMM GRANULOCYTES # BLD AUTO: 0.01 K/UL (ref 0–0.04)
IMM GRANULOCYTES NFR BLD AUTO: 0.2 % (ref 0–0.5)
LDLC SERPL CALC-MCNC: 53.4 MG/DL (ref 63–159)
LYMPHOCYTES # BLD AUTO: 1.1 K/UL (ref 1–4.8)
LYMPHOCYTES NFR BLD: 24.6 % (ref 18–48)
MCH RBC QN AUTO: 32.4 PG (ref 27–31)
MCHC RBC AUTO-ENTMCNC: 34 G/DL (ref 32–36)
MCV RBC AUTO: 95 FL (ref 82–98)
MONOCYTES # BLD AUTO: 0.5 K/UL (ref 0.3–1)
MONOCYTES NFR BLD: 11.9 % (ref 4–15)
NEUTROPHILS # BLD AUTO: 2.6 K/UL (ref 1.8–7.7)
NEUTROPHILS NFR BLD: 58.2 % (ref 38–73)
NONHDLC SERPL-MCNC: 108 MG/DL
NRBC BLD-RTO: 0 /100 WBC
PLATELET # BLD AUTO: 178 K/UL (ref 150–450)
PMV BLD AUTO: 10.5 FL (ref 9.2–12.9)
POTASSIUM SERPL-SCNC: 4.7 MMOL/L (ref 3.5–5.1)
PROT SERPL-MCNC: 7.7 G/DL (ref 6–8.4)
RBC # BLD AUTO: 4.47 M/UL (ref 4.6–6.2)
SODIUM SERPL-SCNC: 142 MMOL/L (ref 136–145)
TRIGL SERPL-MCNC: 273 MG/DL (ref 30–150)
WBC # BLD AUTO: 4.47 K/UL (ref 3.9–12.7)

## 2023-10-09 PROCEDURE — 3074F PR MOST RECENT SYSTOLIC BLOOD PRESSURE < 130 MM HG: ICD-10-PCS | Mod: CPTII,S$GLB,, | Performed by: FAMILY MEDICINE

## 2023-10-09 PROCEDURE — 80053 COMPREHEN METABOLIC PANEL: CPT | Performed by: FAMILY MEDICINE

## 2023-10-09 PROCEDURE — 3079F DIAST BP 80-89 MM HG: CPT | Mod: CPTII,S$GLB,, | Performed by: FAMILY MEDICINE

## 2023-10-09 PROCEDURE — 3074F SYST BP LT 130 MM HG: CPT | Mod: CPTII,S$GLB,, | Performed by: FAMILY MEDICINE

## 2023-10-09 PROCEDURE — 99214 PR OFFICE/OUTPT VISIT, EST, LEVL IV, 30-39 MIN: ICD-10-PCS | Mod: 25,S$GLB,, | Performed by: FAMILY MEDICINE

## 2023-10-09 PROCEDURE — 3008F PR BODY MASS INDEX (BMI) DOCUMENTED: ICD-10-PCS | Mod: CPTII,S$GLB,, | Performed by: FAMILY MEDICINE

## 2023-10-09 PROCEDURE — 90686 FLU VACCINE (QUAD) GREATER THAN OR EQUAL TO 3YO PRESERVATIVE FREE IM: ICD-10-PCS | Mod: S$GLB,,, | Performed by: FAMILY MEDICINE

## 2023-10-09 PROCEDURE — 3008F BODY MASS INDEX DOCD: CPT | Mod: CPTII,S$GLB,, | Performed by: FAMILY MEDICINE

## 2023-10-09 PROCEDURE — 90686 IIV4 VACC NO PRSV 0.5 ML IM: CPT | Mod: S$GLB,,, | Performed by: FAMILY MEDICINE

## 2023-10-09 PROCEDURE — 1160F PR REVIEW ALL MEDS BY PRESCRIBER/CLIN PHARMACIST DOCUMENTED: ICD-10-PCS | Mod: CPTII,S$GLB,, | Performed by: FAMILY MEDICINE

## 2023-10-09 PROCEDURE — 1159F PR MEDICATION LIST DOCUMENTED IN MEDICAL RECORD: ICD-10-PCS | Mod: CPTII,S$GLB,, | Performed by: FAMILY MEDICINE

## 2023-10-09 PROCEDURE — 99214 OFFICE O/P EST MOD 30 MIN: CPT | Mod: 25,S$GLB,, | Performed by: FAMILY MEDICINE

## 2023-10-09 PROCEDURE — 36415 COLL VENOUS BLD VENIPUNCTURE: CPT | Mod: S$GLB,,, | Performed by: FAMILY MEDICINE

## 2023-10-09 PROCEDURE — 90471 IMMUNIZATION ADMIN: CPT | Mod: S$GLB,,, | Performed by: FAMILY MEDICINE

## 2023-10-09 PROCEDURE — 80061 LIPID PANEL: CPT | Performed by: FAMILY MEDICINE

## 2023-10-09 PROCEDURE — 85025 COMPLETE CBC W/AUTO DIFF WBC: CPT | Performed by: FAMILY MEDICINE

## 2023-10-09 PROCEDURE — 99999 PR PBB SHADOW E&M-EST. PATIENT-LVL IV: ICD-10-PCS | Mod: PBBFAC,,, | Performed by: FAMILY MEDICINE

## 2023-10-09 PROCEDURE — 1159F MED LIST DOCD IN RCRD: CPT | Mod: CPTII,S$GLB,, | Performed by: FAMILY MEDICINE

## 2023-10-09 PROCEDURE — 99999 PR PBB SHADOW E&M-EST. PATIENT-LVL IV: CPT | Mod: PBBFAC,,, | Performed by: FAMILY MEDICINE

## 2023-10-09 PROCEDURE — 36415 PR COLLECTION VENOUS BLOOD,VENIPUNCTURE: ICD-10-PCS | Mod: S$GLB,,, | Performed by: FAMILY MEDICINE

## 2023-10-09 PROCEDURE — 1160F RVW MEDS BY RX/DR IN RCRD: CPT | Mod: CPTII,S$GLB,, | Performed by: FAMILY MEDICINE

## 2023-10-09 PROCEDURE — 90471 FLU VACCINE (QUAD) GREATER THAN OR EQUAL TO 3YO PRESERVATIVE FREE IM: ICD-10-PCS | Mod: S$GLB,,, | Performed by: FAMILY MEDICINE

## 2023-10-09 PROCEDURE — 3079F PR MOST RECENT DIASTOLIC BLOOD PRESSURE 80-89 MM HG: ICD-10-PCS | Mod: CPTII,S$GLB,, | Performed by: FAMILY MEDICINE

## 2023-10-09 RX ORDER — CELECOXIB 200 MG/1
200 CAPSULE ORAL DAILY
Qty: 90 CAPSULE | Refills: 1 | Status: SHIPPED | OUTPATIENT
Start: 2023-10-09

## 2023-10-09 NOTE — PROGRESS NOTES
Subjective:       Patient ID: Daniel Vogel is a 58 y.o. male.    Chief Complaint: Follow-up (Pt here for 6 mth f/u. )      Patient here for routine checkup.  He has no complaints.  Patient is taking His statin every day for hyperlipidemia.  He is feeling well on the medication.  He denies side effects such as myalgias.  Has arthritis and psoriasis.        Review of Systems   Constitutional:  Negative for activity change, chills, fatigue, fever and unexpected weight change.   HENT:  Negative for sore throat and trouble swallowing.    Respiratory:  Negative for cough, chest tightness and shortness of breath.    Cardiovascular:  Negative for chest pain and leg swelling.   Gastrointestinal:  Negative for abdominal pain.   Endocrine: Negative for cold intolerance and heat intolerance.   Genitourinary:  Negative for difficulty urinating.   Musculoskeletal:  Negative for back pain and joint swelling.   Skin:  Negative for rash.   Neurological:  Negative for numbness.   Hematological:  Negative for adenopathy.   Psychiatric/Behavioral:  Negative for decreased concentration.        Objective:      Vitals:    10/09/23 0855   BP: 122/80   Pulse: (!) 57   Resp: 18     Physical Exam  Constitutional:       Appearance: Normal appearance. He is well-developed.   HENT:      Head: Normocephalic and atraumatic.      Right Ear: Tympanic membrane, ear canal and external ear normal.      Left Ear: Tympanic membrane, ear canal and external ear normal.      Nose: Nose normal.   Eyes:      Conjunctiva/sclera: Conjunctivae normal.      Pupils: Pupils are equal, round, and reactive to light.   Neck:      Thyroid: No thyromegaly.      Trachea: No tracheal deviation.   Cardiovascular:      Rate and Rhythm: Normal rate and regular rhythm.      Heart sounds: Normal heart sounds. No murmur heard.     No gallop.   Pulmonary:      Effort: Pulmonary effort is normal.      Breath sounds: Normal breath sounds.   Abdominal:      Hernia: There is no  hernia in the left inguinal area.   Genitourinary:     Prostate: Normal.      Rectum: Normal.   Musculoskeletal:         General: Normal range of motion.      Cervical back: Normal range of motion and neck supple.      Right lower leg: No edema.      Left lower leg: No edema.   Skin:     General: Skin is warm and dry.   Neurological:      General: No focal deficit present.      Mental Status: He is alert and oriented to person, place, and time.      Cranial Nerves: No cranial nerve deficit.      Deep Tendon Reflexes: Reflexes are normal and symmetric.   Psychiatric:         Mood and Affect: Mood normal.         Behavior: Behavior normal.         Thought Content: Thought content normal.         Judgment: Judgment normal.           Lab Results   Component Value Date    LDLCALC 53.4 (L) 10/09/2023     PSA, Screen (ng/mL)   Date Value   04/10/2023 0.55     BMP  Lab Results   Component Value Date     10/09/2023    K 4.7 10/09/2023     10/09/2023    CO2 24 10/09/2023    BUN 14 10/09/2023    CREATININE 1.1 10/09/2023    CALCIUM 9.7 10/09/2023    ANIONGAP 12 10/09/2023    ESTGFRAFRICA >60 04/18/2022    EGFRNONAA >60 04/18/2022       Assessment:       1. Dyslipidemia    2. Immunization due    3. Primary osteoarthritis involving multiple joints          Plan:   Daniel was seen today for follow-up.    Diagnoses and all orders for this visit:    Dyslipidemia   Elevated cholesterol  Low fat diet.  Avoid sweets.  A 10 pound weight loss by the next visit as a  good goal.  Increase consumption of fruits and vegetables, fish and chicken.  Use medications below:  Crestor 20 mg po daily    Benign prostatic hyperplasia without lower urinary tract symptoms  PSA, Screening; yearly    Diverticulosis of large intestine without hemorrhage  Watch diet     Psoriasis  -     triamcinolone acetonide 0.1% (KENALOG) 0.1 % cream; Apply topically 2 (two) times daily.      1. Dyslipidemia  Overview:  Elevated cholesterol  Low fat diet.   Avoid sweets.  A 10 pound weight loss by the next visit as a  good goal.  Increase consumption of fruits and vegetables, fish and chicken.  Use medications below:  Crestor 20 mg po daily    Orders:  -     Comprehensive Metabolic Panel; Future; Expected date: 10/09/2023  -     Lipid Panel; Future; Expected date: 10/09/2023  -     CBC Auto Differential; Future; Expected date: 10/09/2023    2. Immunization due  -     Influenza - Quadrivalent *Preferred* (6 months+) (PF)    3. Primary osteoarthritis involving multiple joints  -     celecoxib (CELEBREX) 200 MG capsule; Take 1 capsule (200 mg total) by mouth once daily.  Dispense: 90 capsule; Refill: 1       RTC in 6 months

## 2024-04-24 ENCOUNTER — OFFICE VISIT (OUTPATIENT)
Dept: INTERNAL MEDICINE | Facility: CLINIC | Age: 59
End: 2024-04-24
Payer: COMMERCIAL

## 2024-04-24 VITALS — WEIGHT: 216.5 LBS | HEIGHT: 71 IN | HEART RATE: 52 BPM | RESPIRATION RATE: 18 BRPM | BODY MASS INDEX: 30.31 KG/M2

## 2024-04-24 DIAGNOSIS — E78.5 DYSLIPIDEMIA: Primary | ICD-10-CM

## 2024-04-24 DIAGNOSIS — L40.9 PSORIASIS: ICD-10-CM

## 2024-04-24 DIAGNOSIS — M15.9 PRIMARY OSTEOARTHRITIS INVOLVING MULTIPLE JOINTS: ICD-10-CM

## 2024-04-24 DIAGNOSIS — M77.02 EPICONDYLITIS ELBOW, MEDIAL, LEFT: ICD-10-CM

## 2024-04-24 DIAGNOSIS — R97.20 ELEVATED PSA: ICD-10-CM

## 2024-04-24 LAB
ALBUMIN SERPL BCP-MCNC: 4.4 G/DL (ref 3.5–5.2)
ALP SERPL-CCNC: 53 U/L (ref 55–135)
ALT SERPL W/O P-5'-P-CCNC: 31 U/L (ref 10–44)
ANION GAP SERPL CALC-SCNC: 10 MMOL/L (ref 8–16)
AST SERPL-CCNC: 28 U/L (ref 10–40)
BILIRUB SERPL-MCNC: 0.6 MG/DL (ref 0.1–1)
BUN SERPL-MCNC: 15 MG/DL (ref 6–20)
CALCIUM SERPL-MCNC: 10 MG/DL (ref 8.7–10.5)
CHLORIDE SERPL-SCNC: 105 MMOL/L (ref 95–110)
CHOLEST SERPL-MCNC: 133 MG/DL (ref 120–199)
CHOLEST/HDLC SERPL: 3.6 {RATIO} (ref 2–5)
CO2 SERPL-SCNC: 26 MMOL/L (ref 23–29)
CREAT SERPL-MCNC: 1.1 MG/DL (ref 0.5–1.4)
EST. GFR  (NO RACE VARIABLE): >60 ML/MIN/1.73 M^2
GLUCOSE SERPL-MCNC: 101 MG/DL (ref 70–110)
HDLC SERPL-MCNC: 37 MG/DL (ref 40–75)
HDLC SERPL: 27.8 % (ref 20–50)
LDLC SERPL CALC-MCNC: 66.8 MG/DL (ref 63–159)
NONHDLC SERPL-MCNC: 96 MG/DL
POTASSIUM SERPL-SCNC: 4.9 MMOL/L (ref 3.5–5.1)
PROT SERPL-MCNC: 7.8 G/DL (ref 6–8.4)
SODIUM SERPL-SCNC: 141 MMOL/L (ref 136–145)
TRIGL SERPL-MCNC: 146 MG/DL (ref 30–150)

## 2024-04-24 PROCEDURE — 99999 PR PBB SHADOW E&M-EST. PATIENT-LVL III: CPT | Mod: PBBFAC,,, | Performed by: FAMILY MEDICINE

## 2024-04-24 PROCEDURE — 80053 COMPREHEN METABOLIC PANEL: CPT | Performed by: FAMILY MEDICINE

## 2024-04-24 PROCEDURE — 99214 OFFICE O/P EST MOD 30 MIN: CPT | Mod: 25,S$GLB,, | Performed by: FAMILY MEDICINE

## 2024-04-24 PROCEDURE — 80061 LIPID PANEL: CPT | Performed by: FAMILY MEDICINE

## 2024-04-24 PROCEDURE — 1159F MED LIST DOCD IN RCRD: CPT | Mod: CPTII,S$GLB,, | Performed by: FAMILY MEDICINE

## 2024-04-24 PROCEDURE — 1160F RVW MEDS BY RX/DR IN RCRD: CPT | Mod: CPTII,S$GLB,, | Performed by: FAMILY MEDICINE

## 2024-04-24 PROCEDURE — 20550 NJX 1 TENDON SHEATH/LIGAMENT: CPT | Mod: LT,S$GLB,, | Performed by: FAMILY MEDICINE

## 2024-04-24 PROCEDURE — 3008F BODY MASS INDEX DOCD: CPT | Mod: CPTII,S$GLB,, | Performed by: FAMILY MEDICINE

## 2024-04-24 RX ORDER — TRIAMCINOLONE ACETONIDE 1 MG/G
CREAM TOPICAL 2 TIMES DAILY
Qty: 45 G | Refills: 5 | Status: SHIPPED | OUTPATIENT
Start: 2024-04-24

## 2024-04-24 RX ORDER — ROSUVASTATIN CALCIUM 20 MG/1
20 TABLET, COATED ORAL DAILY
Qty: 90 TABLET | Refills: 1 | Status: SHIPPED | OUTPATIENT
Start: 2024-04-24

## 2024-04-24 RX ORDER — CELECOXIB 200 MG/1
200 CAPSULE ORAL DAILY
Qty: 90 CAPSULE | Refills: 1 | Status: SHIPPED | OUTPATIENT
Start: 2024-04-24

## 2024-04-24 RX ORDER — BUPIVACAINE HYDROCHLORIDE 5 MG/ML
1 INJECTION, SOLUTION PERINEURAL
Status: COMPLETED | OUTPATIENT
Start: 2024-04-24 | End: 2024-04-24

## 2024-04-24 RX ORDER — TRIAMCINOLONE ACETONIDE 40 MG/ML
40 INJECTION, SUSPENSION INTRA-ARTICULAR; INTRAMUSCULAR
Status: COMPLETED | OUTPATIENT
Start: 2024-04-24 | End: 2024-04-24

## 2024-04-24 RX ADMIN — BUPIVACAINE HYDROCHLORIDE 5 MG: 5 INJECTION, SOLUTION PERINEURAL at 10:04

## 2024-04-24 RX ADMIN — TRIAMCINOLONE ACETONIDE 40 MG: 40 INJECTION, SUSPENSION INTRA-ARTICULAR; INTRAMUSCULAR at 10:04

## 2024-04-24 NOTE — PROGRESS NOTES
Subjective:       Patient ID: Daniel Vogel is a 59 y.o. male.    Chief Complaint: Follow-up (6 mo)      Patient here for routine checkup.  He has no complaints.  Patient is taking His statin every day for hyperlipidemia.  He is feeling well on the medication.  He denies side effects such as myalgias.  Has arthritis and psoriasis.  Patient having left medial elbow pain for the past 3 months.  It hurts when he is actively using his arm.        Review of Systems   Constitutional:  Negative for activity change, chills, fatigue, fever and unexpected weight change.   HENT:  Negative for sore throat and trouble swallowing.    Respiratory:  Negative for cough, chest tightness and shortness of breath.    Cardiovascular:  Negative for chest pain and leg swelling.   Gastrointestinal:  Negative for abdominal pain.   Endocrine: Negative for cold intolerance and heat intolerance.   Genitourinary:  Negative for difficulty urinating.   Musculoskeletal:  Positive for arthralgias. Negative for back pain and joint swelling.   Skin:  Negative for rash.   Neurological:  Negative for numbness.   Hematological:  Negative for adenopathy.   Psychiatric/Behavioral:  Negative for decreased concentration.        Objective:      Vitals:    04/24/24 0950   BP: (P) 110/84   Pulse: (!) 52   Resp: 18     Physical Exam  Constitutional:       Appearance: Normal appearance. He is well-developed.   HENT:      Head: Normocephalic and atraumatic.      Right Ear: Tympanic membrane, ear canal and external ear normal.      Left Ear: Tympanic membrane, ear canal and external ear normal.      Nose: Nose normal.   Eyes:      Conjunctiva/sclera: Conjunctivae normal.      Pupils: Pupils are equal, round, and reactive to light.   Neck:      Thyroid: No thyromegaly.      Trachea: No tracheal deviation.   Cardiovascular:      Rate and Rhythm: Normal rate and regular rhythm.      Heart sounds: Normal heart sounds. No murmur heard.     No gallop.   Pulmonary:       Effort: Pulmonary effort is normal.      Breath sounds: Normal breath sounds.   Abdominal:      Hernia: There is no hernia in the left inguinal area.   Genitourinary:     Prostate: Normal.      Rectum: Normal.   Musculoskeletal:         General: Normal range of motion.      Left elbow: Tenderness present.      Cervical back: Normal range of motion and neck supple.      Right lower leg: No edema.      Left lower leg: No edema.      Comments: TTP medial epicondyle   Skin:     General: Skin is warm and dry.   Neurological:      General: No focal deficit present.      Mental Status: He is alert and oriented to person, place, and time.      Cranial Nerves: No cranial nerve deficit.      Deep Tendon Reflexes: Reflexes are normal and symmetric.   Psychiatric:         Mood and Affect: Mood normal.         Behavior: Behavior normal.         Thought Content: Thought content normal.         Judgment: Judgment normal.           Lab Results   Component Value Date    LDLCALC 66.8 04/24/2024     PSA, Screen (ng/mL)   Date Value   04/10/2023 0.55     BMP  Lab Results   Component Value Date     04/24/2024    K 4.9 04/24/2024     04/24/2024    CO2 26 04/24/2024    BUN 15 04/24/2024    CREATININE 1.1 04/24/2024    CALCIUM 10.0 04/24/2024    ANIONGAP 10 04/24/2024    ESTGFRAFRICA >60 04/18/2022    EGFRNONAA >60 04/18/2022     Assessment:       1. Dyslipidemia    2. Psoriasis    3. Primary osteoarthritis involving multiple joints    4. Elevated PSA    5. Epicondylitis elbow, medial, left          Plan:   Daniel was seen today for follow-up.    Diagnoses and all orders for this visit:    Dyslipidemia   Elevated cholesterol  Low fat diet.  Avoid sweets.  A 10 pound weight loss by the next visit as a  good goal.  Increase consumption of fruits and vegetables, fish and chicken.  Use medications below:  Crestor 20 mg po daily    Benign prostatic hyperplasia without lower urinary tract symptoms  PSA, Screening;  yearly    Diverticulosis of large intestine without hemorrhage  Watch diet   Next colonoscopy in 2028    Psoriasis  -     triamcinolone acetonide 0.1% (KENALOG) 0.1 % cream; Apply topically 2 (two) times daily.      1. Dyslipidemia  Overview:  Elevated cholesterol  Low fat diet.  Avoid sweets.  A 10 pound weight loss by the next visit as a  good goal.  Increase consumption of fruits and vegetables, fish and chicken.  Use medications below:  Crestor 20 mg po daily    Orders:  -     rosuvastatin (CRESTOR) 20 MG tablet; Take 1 tablet (20 mg total) by mouth once daily.  Dispense: 90 tablet; Refill: 1  -     Comprehensive Metabolic Panel; Future; Expected date: 04/24/2024  -     Lipid Panel; Future; Expected date: 04/24/2024    2. Psoriasis  -     triamcinolone acetonide 0.1% (KENALOG) 0.1 % cream; Apply topically 2 (two) times daily.  Dispense: 45 g; Refill: 5    3. Primary osteoarthritis involving multiple joints  -     celecoxib (CELEBREX) 200 MG capsule; Take 1 capsule (200 mg total) by mouth once daily.  Dispense: 90 capsule; Refill: 1    4. Elevated PSA  Overview:  Seen urologist for this in the past      5. Epicondylitis elbow, medial, left  Overview:  After obtaining verbal consent, and per orders of Dr. Urrutia, injection of left medial epicondyle given by Jorge Urrutia. Patient felt much better. Patient remained in clinic for 20 minutes afterwards, and did not have any adverse reactions.  Used 1 cc of marcaine and 40 mg Kenalog      Orders:  -     Arthrocentesis  -     BUPivacaine injection 5 mg  -     triamcinolone acetonide injection 40 mg       RTC in 6 months

## 2024-04-25 PROBLEM — M77.02 EPICONDYLITIS ELBOW, MEDIAL, LEFT: Status: ACTIVE | Noted: 2024-04-25

## 2024-08-02 DIAGNOSIS — E78.5 DYSLIPIDEMIA: ICD-10-CM

## 2024-08-02 DIAGNOSIS — M15.9 PRIMARY OSTEOARTHRITIS INVOLVING MULTIPLE JOINTS: ICD-10-CM

## 2024-08-03 RX ORDER — ROSUVASTATIN CALCIUM 20 MG/1
20 TABLET, COATED ORAL
Qty: 90 TABLET | Refills: 2 | Status: SHIPPED | OUTPATIENT
Start: 2024-08-03

## 2024-08-05 RX ORDER — CELECOXIB 200 MG/1
200 CAPSULE ORAL
Qty: 90 CAPSULE | Refills: 1 | Status: SHIPPED | OUTPATIENT
Start: 2024-08-05

## 2024-08-12 ENCOUNTER — OFFICE VISIT (OUTPATIENT)
Dept: FAMILY MEDICINE | Facility: CLINIC | Age: 59
End: 2024-08-12
Payer: COMMERCIAL

## 2024-08-12 VITALS
OXYGEN SATURATION: 95 % | BODY MASS INDEX: 30.66 KG/M2 | DIASTOLIC BLOOD PRESSURE: 84 MMHG | HEART RATE: 56 BPM | SYSTOLIC BLOOD PRESSURE: 120 MMHG | HEIGHT: 71 IN | WEIGHT: 219 LBS | RESPIRATION RATE: 16 BRPM

## 2024-08-12 DIAGNOSIS — R07.82 INTERCOSTAL PAIN: Primary | ICD-10-CM

## 2024-08-12 DIAGNOSIS — G47.33 OSA (OBSTRUCTIVE SLEEP APNEA): ICD-10-CM

## 2024-08-12 DIAGNOSIS — R06.81 WITNESSED EPISODE OF APNEA: ICD-10-CM

## 2024-08-12 LAB
OHS QRS DURATION: 90 MS
OHS QTC CALCULATION: 369 MS

## 2024-08-12 PROCEDURE — 3074F SYST BP LT 130 MM HG: CPT | Mod: CPTII,S$GLB,, | Performed by: FAMILY MEDICINE

## 2024-08-12 PROCEDURE — 99999 PR PBB SHADOW E&M-EST. PATIENT-LVL IV: CPT | Mod: PBBFAC,,, | Performed by: FAMILY MEDICINE

## 2024-08-12 PROCEDURE — 1160F RVW MEDS BY RX/DR IN RCRD: CPT | Mod: CPTII,S$GLB,, | Performed by: FAMILY MEDICINE

## 2024-08-12 PROCEDURE — 1159F MED LIST DOCD IN RCRD: CPT | Mod: CPTII,S$GLB,, | Performed by: FAMILY MEDICINE

## 2024-08-12 PROCEDURE — 99214 OFFICE O/P EST MOD 30 MIN: CPT | Mod: S$GLB,,, | Performed by: FAMILY MEDICINE

## 2024-08-12 PROCEDURE — 93010 ELECTROCARDIOGRAM REPORT: CPT | Mod: S$GLB,,, | Performed by: INTERNAL MEDICINE

## 2024-08-12 PROCEDURE — 3008F BODY MASS INDEX DOCD: CPT | Mod: CPTII,S$GLB,, | Performed by: FAMILY MEDICINE

## 2024-08-12 PROCEDURE — 3079F DIAST BP 80-89 MM HG: CPT | Mod: CPTII,S$GLB,, | Performed by: FAMILY MEDICINE

## 2024-08-12 PROCEDURE — 93005 ELECTROCARDIOGRAM TRACING: CPT | Mod: S$GLB,,, | Performed by: FAMILY MEDICINE

## 2024-08-12 NOTE — PROGRESS NOTES
Subjective:       Patient ID: Daniel Vogel is a 59 y.o. male.    Chief Complaint: Shortness of Breath (Pt is here today with some sob, and tightening of the chest going on for about 3weeks.)    For the past 2 weeks patient has been having tightness in his upper chest and neck area.  He sometimes feels short of breath.  He has a history of sinus surgery in had a uvula removed so help his sleep apnea.  Wife says he snores.  She has to awaken him at times because he will stop breathing.  He is able to walk a mile without worsening symptoms.  There is a strong family history of coronary artery disease.  During the day he falls asleep easily if he is not active.    Shortness of Breath  This is a new problem. The current episode started 1 to 4 weeks ago. The problem occurs daily. The problem has been unchanged. Associated symptoms include chest pain and rhinorrhea. Pertinent negatives include no abdominal pain, claudication, fever, hemoptysis, orthopnea, PND, sore throat, sputum production, swollen glands, syncope, vomiting or wheezing. His past medical history is significant for allergies. There is no history of asthma, bronchiolitis, chronic lung disease, COPD, DVT or a recent surgery.     Review of Systems   Constitutional:  Negative for fever.   HENT:  Positive for rhinorrhea. Negative for sore throat.    Respiratory:  Positive for shortness of breath. Negative for hemoptysis, sputum production and wheezing.    Cardiovascular:  Positive for chest pain. Negative for orthopnea, claudication, syncope and PND.   Gastrointestinal:  Negative for abdominal pain and vomiting.       Objective:      Vitals:    08/12/24 0814   BP: 120/84   Pulse: (!) 56   Resp: 16     Physical Exam  Constitutional:       Appearance: He is well-developed.   HENT:      Head: Normocephalic and atraumatic.      Right Ear: Tympanic membrane normal.      Left Ear: Tympanic membrane normal.      Nose: Congestion present. No rhinorrhea.       Comments: S/P uvulectomy.  He seems to have upper airway resistance syndrome.     Mouth/Throat:      Mouth: Mucous membranes are moist.      Comments: Rounded hypopharynx  Cardiovascular:      Rate and Rhythm: Normal rate and regular rhythm.      Pulses: Normal pulses.      Heart sounds: Normal heart sounds. No murmur heard.     No friction rub. No gallop.   Pulmonary:      Effort: Pulmonary effort is normal.      Breath sounds: Normal breath sounds.   Abdominal:      General: Abdomen is flat.      Palpations: Abdomen is soft.      Tenderness: There is no abdominal tenderness.   Musculoskeletal:         General: Tenderness present.      Right lower leg: No edema.      Left lower leg: No edema.   Neurological:      General: No focal deficit present.      Mental Status: He is oriented to person, place, and time.      Deep Tendon Reflexes: Reflexes are normal and symmetric.   Psychiatric:         Mood and Affect: Mood normal.           EKG- Normal except for bradycardia.  Assessment:       1. Intercostal pain    2. JOY (obstructive sleep apnea)    3. Witnessed episode of apnea        Plan:   1. Intercostal pain  -     EKG 12-lead  -     Ambulatory referral/consult to Cardiology; Future; Expected date: 08/19/2024    2. JOY (obstructive sleep apnea)  -     Polysomnogram (CPAP will be added if patient meets diagnostic criteria.); Future    3. Witnessed episode of apnea  -     Polysomnogram (CPAP will be added if patient meets diagnostic criteria.); Future       RTC in 4 weeks to follow-up sleep study and consult  Sleep hygiene issues discussed.  Elevate head of bed.

## 2024-08-16 ENCOUNTER — OFFICE VISIT (OUTPATIENT)
Dept: CARDIOLOGY | Facility: CLINIC | Age: 59
End: 2024-08-16
Payer: COMMERCIAL

## 2024-08-16 VITALS
HEART RATE: 58 BPM | BODY MASS INDEX: 30.56 KG/M2 | RESPIRATION RATE: 18 BRPM | OXYGEN SATURATION: 98 % | HEIGHT: 71 IN | SYSTOLIC BLOOD PRESSURE: 129 MMHG | DIASTOLIC BLOOD PRESSURE: 83 MMHG | WEIGHT: 218.25 LBS

## 2024-08-16 DIAGNOSIS — E78.5 DYSLIPIDEMIA: ICD-10-CM

## 2024-08-16 DIAGNOSIS — R07.89 CHEST TIGHTNESS: Primary | ICD-10-CM

## 2024-08-16 DIAGNOSIS — R07.82 INTERCOSTAL PAIN: ICD-10-CM

## 2024-08-16 DIAGNOSIS — R06.02 SOB (SHORTNESS OF BREATH): ICD-10-CM

## 2024-08-16 DIAGNOSIS — R00.1 BRADYCARDIA BY ELECTROCARDIOGRAM: ICD-10-CM

## 2024-08-16 DIAGNOSIS — Z82.49 FAMILY HISTORY OF CORONARY ARTERY DISEASE: ICD-10-CM

## 2024-08-16 PROBLEM — Z82.79 FAMILY HISTORY OF CONGENITAL HEART DISEASE IN MOTHER: Status: ACTIVE | Noted: 2024-08-16

## 2024-08-16 PROCEDURE — 99999 PR PBB SHADOW E&M-EST. PATIENT-LVL IV: CPT | Mod: PBBFAC,,, | Performed by: NURSE PRACTITIONER

## 2024-08-16 NOTE — PROGRESS NOTES
[facility]    CC:  Chest tightened    Patient ID: Daniel Vogel is a 59 y.o. male with a past medical history of dyslipidemia, family history of heart disease in mother    HPI  He was referred by PCP to establish care   Patient reports shortness of breath and some chest tightness for approximately 2 weeks  Waxes and wanes  Nonexertional  Has a occurred during his sleep- he also it is occurring currently during his visit   He reports his throat feels like it is also tightening  He denies any recent illnesses or respiratory concerns   Short duration  He thinks it is possibly acid reflux and started on over-the-counter Prilosec yesterday  Drinks 1 large cup of coffee every day with wife    Suspected sleep apnea; sleep study pending with PCP.  Patient has snores with witnessed sleep apnea from wife at night    Bradycardia on EKG; mild  No presyncope or syncopal episodes    Family history of coronary artery disease; mom had 2 heart attacks, 5 stents as well as stroke  Mom's side of family (uncles and aunts) MI/DMT2  Sister had bypass surgery approximately 2 years ago    He is active   Walks a miles daily on most days of the week  He cuts his own grass  Never smoked    Past Medical History:   Diagnosis Date    Elevated PSA     High cholesterol     Kidney stone     Urinary tract infection      Past Surgical History:   Procedure Laterality Date    COLON SURGERY      fissure    COLONOSCOPY  11/13/2018         COLONOSCOPY N/A 11/13/2018    Procedure: COLONOSCOPY;  Surgeon: Jorge Urrutia MD;  Location: [facility];  Service: Endoscopy;  Laterality: N/A;    ELBOW SURGERY      NASAL SEPTUM SURGERY      PROSTATE SURGERY      bx    SHOULDER ARTHROSCOPY      UVULECTOMY       Social History     Socioeconomic History    Marital status:      Spouse name: Radha oVgel    Number of children: 2   Tobacco Use    Smoking status: Never     Passive exposure: Never    Smokeless tobacco: Never   Substance and  Sexual Activity    Alcohol use: Yes     Comment: social    Drug use: Never    Sexual activity: Yes     Partners: Female     Social Determinants of Health     Financial Resource Strain: Medium Risk (8/6/2024)    Overall Financial Resource Strain (CARDIA)     Difficulty of Paying Living Expenses: Somewhat hard   Food Insecurity: No Food Insecurity (8/6/2024)    Hunger Vital Sign     Worried About Running Out of Food in the Last Year: Never true     Ran Out of Food in the Last Year: Never true   Transportation Needs: No Transportation Needs (4/19/2024)    PRAPARE - Transportation     Lack of Transportation (Medical): No     Lack of Transportation (Non-Medical): No   Physical Activity: Sufficiently Active (8/6/2024)    Exercise Vital Sign     Days of Exercise per Week: 6 days     Minutes of Exercise per Session: 30 min   Stress: Stress Concern Present (8/6/2024)    [employer] [place] [employer] - Occupational Stress Questionnaire     Feeling of Stress : To some extent   Housing Stability: Low Risk  (10/8/2023)    Housing Stability Vital Sign     Unable to Pay for Housing in the Last Year: No     Number of Places Lived in the Last Year: 1     Unstable Housing in the Last Year: No     Family History   Problem Relation Name Age of Onset    Heart attack Mother      Stroke Mother      Diabetes Mother      Cancer Father renal cell ca         Renal Cancer    Cancer Maternal Aunt      Cancer Maternal Uncle      Cancer Paternal Aunt      Cancer Paternal Uncle      Prostate cancer Neg Hx      Kidney disease Neg Hx         Review of patient's allergies indicates:  No Known Allergies    Medication List with Changes/Refills   Current Medications    ASPIRIN (ECOTRIN) 81 MG EC TABLET    Take 81 mg by mouth once daily.    CELECOXIB (CELEBREX) 200 MG CAPSULE    TAKE 1 CAPSULE BY MOUTH ONCE DAILY.    CETIRIZINE (ZYRTEC) 10 MG TABLET    Take 1 tablet (10 mg total) by mouth once daily.    FLUTICASONE PROPIONATE (FLONASE) 50  "MCG/ACTUATION NASAL SPRAY    1 spray (50 mcg total) by Each Nostril route once daily.    ROSUVASTATIN (CRESTOR) 20 MG TABLET    TAKE 1 TABLET BY MOUTH EVERY DAY    TRIAMCINOLONE ACETONIDE 0.1% (KENALOG) 0.1 % CREAM    Apply topically 2 (two) times daily.           Review of Systems   Constitutional: Negative.   Cardiovascular:  Positive for chest pain. Negative for claudication, cyanosis, dyspnea on exertion, irregular heartbeat, leg swelling, near-syncope, orthopnea, palpitations, paroxysmal nocturnal dyspnea and syncope.        "Chest tightness"   Respiratory:  Positive for sleep disturbances due to breathing and snoring.    Musculoskeletal: Negative.    Psychiatric/Behavioral:  The patient is nervous/anxious.        Vitals:    08/16/24 0958   BP: 129/83   Pulse: (!) 58   Resp: 18   SpO2: 98%   Weight: 99 kg (218 lb 4.1 oz)   Height: 5' 11" (1.803 m)          Physical Exam  Cardiovascular:      Rate and Rhythm: Normal rate and regular rhythm.      Heart sounds: Normal heart sounds.   Pulmonary:      Effort: Pulmonary effort is normal.      Breath sounds: Normal breath sounds.   Musculoskeletal:         General: Normal range of motion.   Skin:     General: Skin is warm and dry.      Capillary Refill: Capillary refill takes less than 2 seconds.   Neurological:      Mental Status: He is alert and oriented to person, place, and time.           Labs:  Most Recent Data  CBC:   Lab Results   Component Value Date    WBC 4.47 10/09/2023    HGB 14.5 10/09/2023    HCT 42.6 10/09/2023     10/09/2023    MCV 95 10/09/2023    RDW 12.9 10/09/2023     BMP:   Lab Results   Component Value Date     04/24/2024    K 4.9 04/24/2024     04/24/2024    CO2 26 04/24/2024    BUN 15 04/24/2024    CREATININE 1.1 04/24/2024     04/24/2024    CALCIUM 10.0 04/24/2024    MG 1.9 06/19/2017    PHOS 2.7 08/17/2010     LFTS;   Lab Results   Component Value Date    PROT 7.8 04/24/2024    ALBUMIN 4.4 04/24/2024    BILITOT 0.6 " "04/24/2024    AST 28 04/24/2024    ALKPHOS 53 (L) 04/24/2024    ALT 31 04/24/2024     COAGS: No results found for: "INR", "PROTIME", "PTT"  FLP:   Lab Results   Component Value Date    CHOL 133 04/24/2024    HDL 37 (L) 04/24/2024    LDLCALC 66.8 04/24/2024    TRIG 146 04/24/2024    CHOLHDL 27.8 04/24/2024     CARDIAC:   Lab Results   Component Value Date    TROPONINI <0.012 06/19/2017    BNP <10 08/17/2010       Assessment/Plan:  Problem List Items Addressed This Visit          Cardiac/Vascular    Dyslipidemia    Overview     Elevated cholesterol  Low fat diet.  Avoid sweets.  A 10 pound weight loss by the next visit as a  good goal.  Increase consumption of fruits and vegetables, fish and chicken.  Use medications below:  Crestor 20 mg po daily          Other Visit Diagnoses       Family history of early CAD    -  Primary    Intercostal pain              EKG sinus Zaheer with no ischemic changes  Mild bradycardia noted today  No heart failure concerns  Negative cardiac assessment in clinic today  Some anxiety per patient    No exertional shortness of breath or chest tightness; occurs during his sleep or when seated  I am not truly convinced his symptoms are of cardiac etiology I would like for him to continue his trial Prilosec; today's only 2nd day taking it.  This is possibly acid reflux- he reports throat tightening as well.  We discussed reducing his caffeine intake, avoiding greasy, fried, or spicy foods during this time.  I encouraged him to take Gaviscon twice a day for the next for any improvement.  I will order a calcium score for now.  I will call him with results and to evaluation symptoms and if there is no symptom improvement we will proceed with cardiac stress test.    Continue baby aspirin and cholesterol medicine    Total duration of face to face visit time 30 minutes.  Total time spent counseling greater than fifty percent of total visit time.  Counseling included discussion regarding imaging " findings, diagnosis, possibilities, treatment options, risks and benefits.  The patient had many questions regarding the options and long-term effects.    Christy Barnhart, LUCP-C  Cardiology Clinic  [facility]

## 2024-08-21 ENCOUNTER — HOSPITAL ENCOUNTER (OUTPATIENT)
Dept: RADIOLOGY | Facility: HOSPITAL | Age: 59
Discharge: HOME | End: 2024-08-21
Attending: NURSE PRACTITIONER
Payer: COMMERCIAL

## 2024-08-21 DIAGNOSIS — E78.5 DYSLIPIDEMIA: ICD-10-CM

## 2024-08-21 DIAGNOSIS — R07.89 CHEST TIGHTNESS: ICD-10-CM

## 2024-08-21 DIAGNOSIS — Z82.49 FAMILY HISTORY OF CORONARY ARTERY DISEASE: ICD-10-CM

## 2024-08-21 PROCEDURE — 75571 CT HRT W/O DYE W/CA TEST: CPT | Mod: 26,,, | Performed by: RADIOLOGY

## 2024-08-21 PROCEDURE — 75571 CT HRT W/O DYE W/CA TEST: CPT | Mod: TC

## 2024-09-09 ENCOUNTER — OFFICE VISIT (OUTPATIENT)
Dept: FAMILY MEDICINE | Facility: CLINIC | Age: 59
End: 2024-09-09
Payer: COMMERCIAL

## 2024-09-09 VITALS
HEIGHT: 71 IN | OXYGEN SATURATION: 96 % | RESPIRATION RATE: 16 BRPM | WEIGHT: 219.38 LBS | SYSTOLIC BLOOD PRESSURE: 124 MMHG | BODY MASS INDEX: 30.71 KG/M2 | HEART RATE: 59 BPM | DIASTOLIC BLOOD PRESSURE: 82 MMHG

## 2024-09-09 DIAGNOSIS — Z23 NEED FOR VACCINATION: Primary | ICD-10-CM

## 2024-09-09 DIAGNOSIS — G47.33 OSA (OBSTRUCTIVE SLEEP APNEA): ICD-10-CM

## 2024-09-09 DIAGNOSIS — R06.81 WITNESSED EPISODE OF APNEA: ICD-10-CM

## 2024-09-09 DIAGNOSIS — K21.9 GASTROESOPHAGEAL REFLUX DISEASE WITHOUT ESOPHAGITIS: ICD-10-CM

## 2024-09-09 DIAGNOSIS — R07.82 INTERCOSTAL PAIN: ICD-10-CM

## 2024-09-09 PROCEDURE — 3008F BODY MASS INDEX DOCD: CPT | Mod: CPTII,S$GLB,, | Performed by: FAMILY MEDICINE

## 2024-09-09 PROCEDURE — 99214 OFFICE O/P EST MOD 30 MIN: CPT | Mod: 25,S$GLB,, | Performed by: FAMILY MEDICINE

## 2024-09-09 PROCEDURE — 3074F SYST BP LT 130 MM HG: CPT | Mod: CPTII,S$GLB,, | Performed by: FAMILY MEDICINE

## 2024-09-09 PROCEDURE — 3079F DIAST BP 80-89 MM HG: CPT | Mod: CPTII,S$GLB,, | Performed by: FAMILY MEDICINE

## 2024-09-09 PROCEDURE — 99999 PR PBB SHADOW E&M-EST. PATIENT-LVL III: CPT | Mod: PBBFAC,,, | Performed by: FAMILY MEDICINE

## 2024-09-09 PROCEDURE — 90656 IIV3 VACC NO PRSV 0.5 ML IM: CPT | Mod: S$GLB,,, | Performed by: FAMILY MEDICINE

## 2024-09-09 PROCEDURE — 1160F RVW MEDS BY RX/DR IN RCRD: CPT | Mod: CPTII,S$GLB,, | Performed by: FAMILY MEDICINE

## 2024-09-09 PROCEDURE — 1159F MED LIST DOCD IN RCRD: CPT | Mod: CPTII,S$GLB,, | Performed by: FAMILY MEDICINE

## 2024-09-09 PROCEDURE — 90471 IMMUNIZATION ADMIN: CPT | Mod: S$GLB,,, | Performed by: FAMILY MEDICINE

## 2024-09-09 RX ORDER — OMEPRAZOLE 40 MG/1
40 CAPSULE, DELAYED RELEASE ORAL DAILY
Qty: 30 CAPSULE | Refills: 1 | Status: SHIPPED | OUTPATIENT
Start: 2024-09-09 | End: 2025-09-09

## 2024-09-09 NOTE — PROGRESS NOTES
Subjective:       Patient ID: Daniel Vogel is a 59 y.o. male.    Chief Complaint: Follow-up (Pt is here today for a  4week f/u./)    For the past 2 weeks patient has been having tightness in his upper chest and neck area.  He sometimes feels short of breath.  He has a history of sinus surgery in had a uvula removed so help his sleep apnea.  Wife says he snores.  She has to awaken him at times because he will stop breathing.  He is able to walk a mile without worsening symptoms.  There is a strong family history of coronary artery disease.  During the day he falls asleep easily if he is not active.  Cardiology did calcium score and it was 31.  Still having the SOB and intercostal chest pain.  Prilosec helped.      Shortness of Breath  This is a new problem. The current episode started 1 to 4 weeks ago. The problem occurs daily. The problem has been unchanged. Associated symptoms include chest pain and rhinorrhea. Pertinent negatives include no abdominal pain, claudication, fever, hemoptysis, orthopnea, PND, sore throat, sputum production, swollen glands, syncope, vomiting or wheezing. His past medical history is significant for allergies. There is no history of asthma, bronchiolitis, chronic lung disease, COPD, DVT or a recent surgery.   Follow-up  Associated symptoms include chest pain. Pertinent negatives include no abdominal pain, fever, sore throat, swollen glands or vomiting.     Review of Systems   Constitutional:  Negative for fever.   HENT:  Positive for rhinorrhea. Negative for sore throat.    Respiratory:  Positive for shortness of breath. Negative for hemoptysis, sputum production and wheezing.    Cardiovascular:  Positive for chest pain. Negative for orthopnea, claudication, syncope and PND.   Gastrointestinal:  Negative for abdominal pain and vomiting.       Objective:      Vitals:    09/09/24 0841   BP: 124/82   Pulse: (!) 59   Resp: 16     Physical Exam  Constitutional:       Appearance: He is  well-developed.   HENT:      Head: Normocephalic and atraumatic.      Right Ear: Tympanic membrane normal.      Left Ear: Tympanic membrane normal.      Nose: Congestion present. No rhinorrhea.      Comments: S/P uvulectomy.  He seems to have upper airway resistance syndrome.     Mouth/Throat:      Mouth: Mucous membranes are moist.      Comments: Rounded hypopharynx  Cardiovascular:      Rate and Rhythm: Normal rate and regular rhythm.      Pulses: Normal pulses.      Heart sounds: Normal heart sounds. No murmur heard.     No friction rub. No gallop.   Pulmonary:      Effort: Pulmonary effort is normal.      Breath sounds: Normal breath sounds.   Abdominal:      General: Abdomen is flat.      Palpations: Abdomen is soft.      Tenderness: There is no abdominal tenderness.   Musculoskeletal:         General: Tenderness present.      Right lower leg: No edema.      Left lower leg: No edema.   Neurological:      General: No focal deficit present.      Mental Status: He is oriented to person, place, and time.      Deep Tendon Reflexes: Reflexes are normal and symmetric.   Psychiatric:         Mood and Affect: Mood normal.           EKG- Normal except for bradycardia.  Assessment:       1. Intercostal pain    2. Gastroesophageal reflux disease without esophagitis    3. JOY (obstructive sleep apnea)    4. Witnessed episode of apnea          Plan:   1. Intercostal pain  -     Polysomnogram (CPAP will be added if patient meets diagnostic criteria.); Future    2. Gastroesophageal reflux disease without esophagitis  -     omeprazole (PRILOSEC) 40 MG capsule; Take 1 capsule (40 mg total) by mouth once daily.  Dispense: 30 capsule; Refill: 1    3. JOY (obstructive sleep apnea)  -     Polysomnogram (CPAP will be added if patient meets diagnostic criteria.); Future    4. Witnessed episode of apnea  -     Polysomnogram (CPAP will be added if patient meets diagnostic criteria.); Future         RTC in 4 weeks to follow-up sleep  study and consult  Sleep hygiene issues discussed.  Elevate head of bed.

## 2024-09-23 ENCOUNTER — HOSPITAL ENCOUNTER (OUTPATIENT)
Dept: SLEEP MEDICINE | Facility: HOSPITAL | Age: 59
Discharge: HOME | End: 2024-09-23
Attending: FAMILY MEDICINE
Payer: COMMERCIAL

## 2024-09-23 DIAGNOSIS — R07.82 INTERCOSTAL PAIN: ICD-10-CM

## 2024-09-23 DIAGNOSIS — G47.33 OSA (OBSTRUCTIVE SLEEP APNEA): ICD-10-CM

## 2024-09-23 DIAGNOSIS — R06.81 WITNESSED EPISODE OF APNEA: ICD-10-CM

## 2024-09-23 PROCEDURE — 95810 POLYSOM 6/> YRS 4/> PARAM: CPT

## 2024-09-25 PROBLEM — R06.81 WITNESSED EPISODE OF APNEA: Status: ACTIVE | Noted: 2024-09-25

## 2024-09-25 PROBLEM — G47.33 OSA (OBSTRUCTIVE SLEEP APNEA): Status: ACTIVE | Noted: 2024-09-25

## 2024-09-25 PROBLEM — R07.82 INTERCOSTAL PAIN: Status: ACTIVE | Noted: 2024-09-25

## 2024-09-27 ENCOUNTER — PATIENT MESSAGE (OUTPATIENT)
Dept: FAMILY MEDICINE | Facility: CLINIC | Age: 59
End: 2024-09-27
Payer: COMMERCIAL

## 2024-09-27 DIAGNOSIS — G47.33 OSA (OBSTRUCTIVE SLEEP APNEA): Primary | ICD-10-CM

## 2024-09-27 NOTE — TELEPHONE ENCOUNTER
Diagnoses and all orders for this visit:    JOY (obstructive sleep apnea)  -     BiPAP/CPAP Titration ((Must have dx of JOY from previous sleep study); Future

## 2024-10-01 DIAGNOSIS — K21.9 GASTROESOPHAGEAL REFLUX DISEASE WITHOUT ESOPHAGITIS: ICD-10-CM

## 2024-10-01 NOTE — TELEPHONE ENCOUNTER
Refill Routing Note   Medication(s) are not appropriate for processing by [facility] for the following reason(s):        New or recently adjusted medication    [facility] action(s):  Defer               Appointments  past 12m or future 3m with PCP    Date Provider   Last Visit   9/9/2024 Jorge Urrutia MD   Next Visit   10/7/2024 Jorge Urrutia MD   ED visits in past 90 days: 0        Note composed:4:11 PM 10/01/2024

## 2024-10-01 NOTE — TELEPHONE ENCOUNTER
No care due was identified.  [facility] Embedded Care Due Messages. Reference number: [id number].   10/01/2024 10:33:24 AM CDT

## 2024-10-02 DIAGNOSIS — G47.33 OSA (OBSTRUCTIVE SLEEP APNEA): Primary | ICD-10-CM

## 2024-10-02 RX ORDER — OMEPRAZOLE 40 MG/1
40 CAPSULE, DELAYED RELEASE ORAL
Qty: 90 CAPSULE | Refills: 3 | Status: SHIPPED | OUTPATIENT
Start: 2024-10-02

## 2024-10-03 ENCOUNTER — HOSPITAL ENCOUNTER (OUTPATIENT)
Dept: SLEEP MEDICINE | Facility: HOSPITAL | Age: 59
Discharge: HOME | End: 2024-10-03
Attending: FAMILY MEDICINE
Payer: COMMERCIAL

## 2024-10-03 DIAGNOSIS — G47.33 OSA (OBSTRUCTIVE SLEEP APNEA): ICD-10-CM

## 2024-10-07 ENCOUNTER — OFFICE VISIT (OUTPATIENT)
Dept: FAMILY MEDICINE | Facility: CLINIC | Age: 59
End: 2024-10-07
Payer: COMMERCIAL

## 2024-10-07 ENCOUNTER — CLINICAL SUPPORT (OUTPATIENT)
Dept: FAMILY MEDICINE | Facility: CLINIC | Age: 59
End: 2024-10-07
Payer: COMMERCIAL

## 2024-10-07 VITALS
WEIGHT: 219 LBS | DIASTOLIC BLOOD PRESSURE: 82 MMHG | SYSTOLIC BLOOD PRESSURE: 118 MMHG | BODY MASS INDEX: 30.66 KG/M2 | RESPIRATION RATE: 16 BRPM | OXYGEN SATURATION: 96 % | HEART RATE: 62 BPM | HEIGHT: 71 IN

## 2024-10-07 DIAGNOSIS — K21.9 GASTROESOPHAGEAL REFLUX DISEASE WITHOUT ESOPHAGITIS: ICD-10-CM

## 2024-10-07 DIAGNOSIS — E78.5 DYSLIPIDEMIA: ICD-10-CM

## 2024-10-07 DIAGNOSIS — G47.33 OSA (OBSTRUCTIVE SLEEP APNEA): Primary | ICD-10-CM

## 2024-10-07 DIAGNOSIS — R07.82 INTERCOSTAL PAIN: ICD-10-CM

## 2024-10-07 PROCEDURE — 99999 PR PBB SHADOW E&M-EST. PATIENT-LVL IV: CPT | Mod: PBBFAC,,, | Performed by: FAMILY MEDICINE

## 2024-10-07 PROCEDURE — 80053 COMPREHEN METABOLIC PANEL: CPT | Performed by: FAMILY MEDICINE

## 2024-10-07 PROCEDURE — 3079F DIAST BP 80-89 MM HG: CPT | Mod: CPTII,S$GLB,, | Performed by: FAMILY MEDICINE

## 2024-10-07 PROCEDURE — 36415 COLL VENOUS BLD VENIPUNCTURE: CPT | Mod: S$GLB,,, | Performed by: FAMILY MEDICINE

## 2024-10-07 PROCEDURE — 3008F BODY MASS INDEX DOCD: CPT | Mod: CPTII,S$GLB,, | Performed by: FAMILY MEDICINE

## 2024-10-07 PROCEDURE — 99214 OFFICE O/P EST MOD 30 MIN: CPT | Mod: S$GLB,,, | Performed by: FAMILY MEDICINE

## 2024-10-07 PROCEDURE — 80061 LIPID PANEL: CPT | Performed by: FAMILY MEDICINE

## 2024-10-07 PROCEDURE — 1160F RVW MEDS BY RX/DR IN RCRD: CPT | Mod: CPTII,S$GLB,, | Performed by: FAMILY MEDICINE

## 2024-10-07 PROCEDURE — 3074F SYST BP LT 130 MM HG: CPT | Mod: CPTII,S$GLB,, | Performed by: FAMILY MEDICINE

## 2024-10-07 PROCEDURE — 1159F MED LIST DOCD IN RCRD: CPT | Mod: CPTII,S$GLB,, | Performed by: FAMILY MEDICINE

## 2024-10-07 NOTE — PROGRESS NOTES
Subjective:       Patient ID: Daniel Vogel is a 59 y.o. male.    Chief Complaint: Follow-up (Pt is here today for a 4week f/u)    Patient here for follow up for tightness in his upper chest and neck area.  He sometimes feels short of breath.  He has a history of sinus surgery in had a uvula removed so help his sleep apnea.  Wife says he snores.  She has to awaken him at times because he will stop breathing.  He is able to walk a mile without worsening symptoms.  There is a strong family history of coronary artery disease.  During the day he falls asleep easily if he is not active.  Cardiology did calcium score and it was 31.  Still having the SOB and intercostal chest pain.  Prilosec very helpful. It resolved his pain away.   He tested positive for moderate sleep apnea.        Review of Systems   Constitutional:  Negative for fever.   HENT:  Positive for rhinorrhea. Negative for sore throat.    Respiratory:  Negative for shortness of breath and wheezing.    Cardiovascular:  Negative for chest pain.   Gastrointestinal:  Negative for abdominal pain and vomiting.       Objective:      Vitals:    10/07/24 1514   BP: 118/82   Pulse: 62   Resp: 16     Physical Exam  Constitutional:       Appearance: He is well-developed.   HENT:      Head: Normocephalic and atraumatic.      Right Ear: Tympanic membrane normal.      Left Ear: Tympanic membrane normal.      Nose: No congestion or rhinorrhea.      Comments: S/P uvulectomy.  He seems to have upper airway resistance syndrome.     Mouth/Throat:      Mouth: Mucous membranes are moist.      Comments: Rounded hypopharynx  Cardiovascular:      Rate and Rhythm: Normal rate and regular rhythm.      Pulses: Normal pulses.      Heart sounds: Normal heart sounds. No murmur heard.     No friction rub. No gallop.   Pulmonary:      Effort: Pulmonary effort is normal.      Breath sounds: Normal breath sounds.   Abdominal:      General: Abdomen is flat.      Palpations: Abdomen is soft.       Tenderness: There is no abdominal tenderness.   Musculoskeletal:         General: No tenderness.      Right lower leg: No edema.      Left lower leg: No edema.   Neurological:      General: No focal deficit present.      Mental Status: He is oriented to person, place, and time.      Deep Tendon Reflexes: Reflexes are normal and symmetric.   Psychiatric:         Mood and Affect: Mood normal.           EKG- Normal except for bradycardia.  Assessment:       1. JOY (obstructive sleep apnea)    2. Intercostal pain    3. Gastroesophageal reflux disease without esophagitis    4. Dyslipidemia            Plan:   1. JOY (obstructive sleep apnea)  Overview:  Polysomnogram reveals patient has obstructive sleep apnea.  He just finished taking his CPAP titration study.  Hopefully he will be on CPAP soon      2. Intercostal pain  Overview:  This is probably from reflux and obstructive sleep apnea      3. Gastroesophageal reflux disease without esophagitis  Overview:  Continue Prilosec 40 mg daily   Reflux precautions should be followed.      4. Dyslipidemia  Overview:  Elevated cholesterol  Low fat diet.  Avoid sweets.  A 10 pound weight loss by the next visit as a  good goal.  Increase consumption of fruits and vegetables, fish and chicken.  Use medications below:  Crestor 20 mg po daily    Orders:  -     Comprehensive Metabolic Panel; Future; Expected date: 10/07/2024  -     Lipid Panel; Future; Expected date: 10/07/2024           RTC in 6 months  Sleep hygiene issues discussed.  Elevate head of bed.

## 2024-10-08 LAB
ALBUMIN SERPL BCP-MCNC: 4.5 G/DL (ref 3.5–5.2)
ALP SERPL-CCNC: 49 U/L (ref 55–135)
ALT SERPL W/O P-5'-P-CCNC: 28 U/L (ref 10–44)
ANION GAP SERPL CALC-SCNC: 9 MMOL/L (ref 8–16)
AST SERPL-CCNC: 21 U/L (ref 10–40)
BILIRUB SERPL-MCNC: 0.6 MG/DL (ref 0.1–1)
BUN SERPL-MCNC: 15 MG/DL (ref 6–20)
CALCIUM SERPL-MCNC: 9.6 MG/DL (ref 8.7–10.5)
CHLORIDE SERPL-SCNC: 105 MMOL/L (ref 95–110)
CHOLEST SERPL-MCNC: 130 MG/DL (ref 120–199)
CHOLEST/HDLC SERPL: 3.6 {RATIO} (ref 2–5)
CO2 SERPL-SCNC: 26 MMOL/L (ref 23–29)
CREAT SERPL-MCNC: 1.2 MG/DL (ref 0.5–1.4)
EST. GFR  (NO RACE VARIABLE): >60 ML/MIN/1.73 M^2
GLUCOSE SERPL-MCNC: 90 MG/DL (ref 70–110)
HDLC SERPL-MCNC: 36 MG/DL (ref 40–75)
HDLC SERPL: 27.7 % (ref 20–50)
LDLC SERPL CALC-MCNC: 40 MG/DL (ref 63–159)
NONHDLC SERPL-MCNC: 94 MG/DL
POTASSIUM SERPL-SCNC: 4.7 MMOL/L (ref 3.5–5.1)
PROT SERPL-MCNC: 7.5 G/DL (ref 6–8.4)
SODIUM SERPL-SCNC: 140 MMOL/L (ref 136–145)
TRIGL SERPL-MCNC: 270 MG/DL (ref 30–150)

## 2024-10-16 ENCOUNTER — TELEPHONE (OUTPATIENT)
Dept: FAMILY MEDICINE | Facility: CLINIC | Age: 59
End: 2024-10-16
Payer: COMMERCIAL

## 2024-10-16 DIAGNOSIS — G47.33 OSA ON CPAP: Primary | ICD-10-CM

## 2024-10-16 NOTE — TELEPHONE ENCOUNTER
----- Message from Jorge Urrutia MD sent at 10/16/2024  8:17 AM CDT -----  Tell patient that cpap results came in.  Trial of CPAP therapy on 7 cm H2O with a Medium size Resmed Full Face AirFit F20 mask and heatedhumidifi cation.

## 2024-10-21 ENCOUNTER — PATIENT MESSAGE (OUTPATIENT)
Dept: FAMILY MEDICINE | Facility: CLINIC | Age: 59
End: 2024-10-21
Payer: COMMERCIAL

## 2024-12-16 DIAGNOSIS — R93.1 ELEVATED CORONARY ARTERY CALCIUM SCORE: Primary | ICD-10-CM

## 2025-01-09 ENCOUNTER — TELEPHONE (OUTPATIENT)
Dept: FAMILY MEDICINE | Facility: CLINIC | Age: 60
End: 2025-01-09
Payer: COMMERCIAL

## 2025-01-09 DIAGNOSIS — M15.0 PRIMARY OSTEOARTHRITIS INVOLVING MULTIPLE JOINTS: ICD-10-CM

## 2025-01-09 DIAGNOSIS — E78.5 DYSLIPIDEMIA: ICD-10-CM

## 2025-01-09 NOTE — TELEPHONE ENCOUNTER
----- Message from Cornelio sent at 2025  9:50 AM CST -----  Contact: [employer]  Daniel Vogel  MRN: [id number]  : 1965  PCP: Jorge Urrutia  Home Phone      Not on file.  Work Phone      Not on file.  Mobile          [phone]      MESSAGE:   Pt requesting refill or new Rx.   Is this a refill or new RX:  New Rx's  RX name and strength: Rosuvastatin 20 mg                                        Celecoxib 200 mg   Last office visit: 10/07/24  Is this a 30-day or 90-day RX:  90 day  Pharmacy name and location:  [facility]  Comments:      Phone:  fax    PCP: Ghada

## 2025-01-10 RX ORDER — ROSUVASTATIN CALCIUM 20 MG/1
20 TABLET, COATED ORAL DAILY
Qty: 90 TABLET | Refills: 1 | Status: SHIPPED | OUTPATIENT
Start: 2025-01-10

## 2025-01-10 RX ORDER — CELECOXIB 200 MG/1
200 CAPSULE ORAL DAILY
Qty: 90 CAPSULE | Refills: 1 | Status: SHIPPED | OUTPATIENT
Start: 2025-01-10

## 2025-01-10 NOTE — TELEPHONE ENCOUNTER
Diagnoses and all orders for this visit:    Dyslipidemia  -     rosuvastatin (CRESTOR) 20 MG tablet; Take 1 tablet (20 mg total) by mouth once daily.    Primary osteoarthritis involving multiple joints  -     celecoxib (CELEBREX) 200 MG capsule; Take 1 capsule (200 mg total) by mouth once daily.

## 2025-01-15 DIAGNOSIS — E78.5 DYSLIPIDEMIA: ICD-10-CM

## 2025-01-15 DIAGNOSIS — M15.0 PRIMARY OSTEOARTHRITIS INVOLVING MULTIPLE JOINTS: ICD-10-CM

## 2025-01-15 RX ORDER — ROSUVASTATIN CALCIUM 20 MG/1
20 TABLET, COATED ORAL DAILY
Qty: 90 TABLET | Refills: 1 | Status: SHIPPED | OUTPATIENT
Start: 2025-01-15

## 2025-01-15 RX ORDER — CELECOXIB 200 MG/1
200 CAPSULE ORAL DAILY
Qty: 90 CAPSULE | Refills: 1 | Status: SHIPPED | OUTPATIENT
Start: 2025-01-15

## 2025-01-15 NOTE — TELEPHONE ENCOUNTER
Unable to retrieve patient chart and identify care due.  [facility] Embedded Care Due Messages. Reference number: [id number].   1/15/2025 7:55:21 AM CST

## 2025-02-13 ENCOUNTER — TELEPHONE (OUTPATIENT)
Dept: ORTHOPEDICS | Facility: CLINIC | Age: 60
End: 2025-02-13
Payer: COMMERCIAL

## 2025-02-13 ENCOUNTER — HOSPITAL ENCOUNTER (OUTPATIENT)
Dept: RADIOLOGY | Facility: HOSPITAL | Age: 60
Discharge: HOME | End: 2025-02-13
Payer: COMMERCIAL

## 2025-02-13 ENCOUNTER — OFFICE VISIT (OUTPATIENT)
Dept: ORTHOPEDICS | Facility: CLINIC | Age: 60
End: 2025-02-13
Payer: COMMERCIAL

## 2025-02-13 DIAGNOSIS — M25.522 LEFT ELBOW PAIN: ICD-10-CM

## 2025-02-13 DIAGNOSIS — M25.531 RIGHT WRIST PAIN: ICD-10-CM

## 2025-02-13 DIAGNOSIS — M77.02 MEDIAL EPICONDYLITIS OF ELBOW, LEFT: ICD-10-CM

## 2025-02-13 DIAGNOSIS — M25.531 RIGHT WRIST PAIN: Primary | ICD-10-CM

## 2025-02-13 DIAGNOSIS — S63.501A SPRAIN OF RIGHT WRIST, INITIAL ENCOUNTER: ICD-10-CM

## 2025-02-13 PROCEDURE — 73110 X-RAY EXAM OF WRIST: CPT | Mod: TC,RT

## 2025-02-13 PROCEDURE — 73080 X-RAY EXAM OF ELBOW: CPT | Mod: 26,LT,, | Performed by: INTERNAL MEDICINE

## 2025-02-13 PROCEDURE — 73080 X-RAY EXAM OF ELBOW: CPT | Mod: TC,LT

## 2025-02-13 PROCEDURE — 73110 X-RAY EXAM OF WRIST: CPT | Mod: 26,RT,, | Performed by: INTERNAL MEDICINE

## 2025-02-13 NOTE — TELEPHONE ENCOUNTER
Patient communication     Notified patient to stop at [facility] - 1st floor check in [address] [facility] [address] 30 minutes prior to your appointment time on 2/13/2025 with Megan Silvestre PA-C for x-rays.     Made them aware that this is not a scheduled xray appointment and they might be running behind as they are considered a walk-in xray.    Verbalized the Following:  *Please arrive at your informed time above, if you are more than 15 Minutes late to your appointment with Megan Silvestre PA-C we will have to reschedule your appointment. This will allow you to be seen in a timely manor and be conscious to other patients being seen that same day*     Pilar Hollins MA  [facility]  P: [phone]  F: [phone]

## 2025-02-14 ENCOUNTER — TELEPHONE (OUTPATIENT)
Dept: ORTHOPEDICS | Facility: CLINIC | Age: 60
End: 2025-02-14
Payer: COMMERCIAL

## 2025-02-14 NOTE — TELEPHONE ENCOUNTER
#1 Saw [facility] - MRI right wrist results - DOI 10/24 - hammering, hit ulnar wrist; pain since then.  #2 Left elbow - L medial epi 4/25/24 Dr. Urrutia - [address] [place] [address]       Scheduled patient an appointment at [facility] to review MRI results and discuss left elbow pain.    Judson Long MS, OTC   Sports Medicine Assistant   [facility]  (P) [phone]  (F) [phone]

## 2025-02-14 NOTE — PROGRESS NOTES
"[facility]  History & Physical  Orthopedics    Subjective:      Chief Complaint: Pain and Injury of the Right Wrist (DOI 0CT 2024) and Pain of the Left Elbow    Referring Provider: Self, Aaareferral     History of Present Illness:  Daniel Vogel is a 59 y.o. right hand dominant male who presents to clinic today with right wrist pain for approx. 4-5 months since injuring his wrist while hammering in October 2024. He states he was gripping the hammer with his hand in supination and hammering the object sideways. Pain is located mainly in the ulnar aspect of the wrist. He describes intermittent sharp pains described as an "ice pick" sensation that moves around the ulnar and dorsal aspects of the wrist.  He also feels popping in his wrist.  The pain is exacerbated by side-to-side motions and lifting objects, causing him to drop objects.  He states pain wakes him up at night while sleeping. He has been wearing his wife's wrist brace to sleep, which helps with night pains. He states he occasionally experiences paresthesias in hands when sleeping in certain positions, but denies any other regular numbness and tingling in the hands. He states last week he experienced one episode of numbness and tingling in the fingertips of the left thumb index and middle fingers that lasted about 3-4 days. 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 He has been taking ibuprofen and Aleve for pain relief.    Patient also mentions left medial elbow pain ongoing for several months. It is a mild constant soreness, but varies in intensity depending on activity. He reports having previous left tennis elbow 20 years ago, receiving multiple cortisone injections from his PCP before ultimately requiring surgery due to a detached and decaying tendon. Daniel denies any numbness or tingling in his left " hand.      SURGICAL HISTORY:  - right elbow surgery approximately 20 years ago    WORK STATUS:  - Retired for six years  - Previously worked in  work for the last part of career  - Currently works on personal [employer] and does  work on the side  - Uses hands frequently for mechanical work    Of note, patient had 3 falls in the past 1.5 months.        The patient denies any fevers, chills, N/V, D/C and presents for evaluation.    Vitals:    02/13/25 1050   PainSc:   8   PainLoc: Elbow     Review of Systems:  Constitutional: no fever or chills  Eyes: no visual changes  ENT: no nasal congestion or sore throat  Respiratory: no cough or shortness of breath  Cardiovascular: no chest pain  Gastrointestinal: no nausea or vomiting, tolerating diet  Musculoskeletal: Pain, soreness.     Past Medical History:   Diagnosis Date    Elevated PSA     High cholesterol     Kidney stone     Urinary tract infection      Past Surgical History:   Procedure Laterality Date    COLON SURGERY      fissure    COLONOSCOPY  11/13/2018         COLONOSCOPY N/A 11/13/2018    Procedure: COLONOSCOPY;  Surgeon: Jorge Urrutia MD;  Location: [facility];  Service: Endoscopy;  Laterality: N/A;    ELBOW SURGERY      NASAL SEPTUM SURGERY      PROSTATE SURGERY      bx    SHOULDER ARTHROSCOPY      UVULECTOMY       Family History   Problem Relation Name Age of Onset    Heart attack Mother      Stroke Mother      Diabetes Mother      Cancer Father renal cell ca         Renal Cancer    Cancer Maternal Aunt      Cancer Maternal Uncle      Cancer Paternal Aunt      Cancer Paternal Uncle      Prostate cancer Neg Hx      Kidney disease Neg Hx       Social History     Socioeconomic History    Marital status:      Spouse name: Radha Vogel    Number of children: 2   Tobacco Use    Smoking status: Never     Passive exposure: Never    Smokeless tobacco: Never   Substance and Sexual Activity    Alcohol use: Yes     Comment: social    Drug  use: Never    Sexual activity: Yes     Partners: Female     Social Drivers of Health     Financial Resource Strain: Medium Risk (8/6/2024)    Overall Financial Resource Strain (CARDIA)     Difficulty of Paying Living Expenses: Somewhat hard   Food Insecurity: No Food Insecurity (8/6/2024)    Hunger Vital Sign     Worried About Running Out of Food in the Last Year: Never true     Ran Out of Food in the Last Year: Never true   Transportation Needs: No Transportation Needs (4/19/2024)    PRAPARE - Transportation     Lack of Transportation (Medical): No     Lack of Transportation (Non-Medical): No   Physical Activity: Sufficiently Active (8/6/2024)    Exercise Vital Sign     Days of Exercise per Week: 6 days     Minutes of Exercise per Session: 30 min   Stress: Stress Concern Present (8/6/2024)    [employer] [place] [employer] - Occupational Stress Questionnaire     Feeling of Stress : To some extent   Housing Stability: Low Risk  (10/8/2023)    Housing Stability Vital Sign     Unable to Pay for Housing in the Last Year: No     Number of Places Lived in the Last Year: 1     Unstable Housing in the Last Year: No       Current Outpatient Medications   Medication Sig Dispense Refill    aspirin (ECOTRIN) 81 MG EC tablet Take 81 mg by mouth once daily.      celecoxib (CELEBREX) 200 MG capsule Take 1 capsule (200 mg total) by mouth once daily. 90 capsule 1    cetirizine (ZYRTEC) 10 MG tablet Take 1 tablet (10 mg total) by mouth once daily. 30 tablet 1    fluticasone propionate (FLONASE) 50 mcg/actuation nasal spray 1 spray (50 mcg total) by Each Nostril route once daily. 16 g 3    omeprazole (PRILOSEC) 40 MG capsule TAKE 1 CAPSULE BY MOUTH EVERY DAY 90 capsule 3    rosuvastatin (CRESTOR) 20 MG tablet Take 1 tablet (20 mg total) by mouth once daily. 90 tablet 1    triamcinolone acetonide 0.1% (KENALOG) 0.1 % cream Apply topically 2 (two) times daily. 45 g 5     No current facility-administered medications for this  visit.     Review of patient's allergies indicates:  No Known Allergies    Objective:   Vital Signs (Most Recent):  There were no vitals filed for this visit.  There is no height or weight on file to calculate BMI.    Physical Exam:  Constitutional: The patient appears well-developed and well-nourished. No distress.   Skin: No lesions appreciated  Head: Normocephalic and atraumatic.   Nose: Nose normal.   Ears: No deformities seen  Eyes: Conjunctivae and EOM are normal.   Neck: No tracheal deviation present.   Cardiovascular: Normal rate and intact distal pulses.    Pulmonary/Chest: Effort normal. No respiratory distress.   Abdominal: There is no guarding.   Neurological: The patient is alert.   Psychiatric: The patient has a normal mood and affect.     HAND/WRIST EXAMINATION:    Observation/Inspection:  Swelling:   Right ulnar wrist   Deformity  none  Discoloration  none     Scars   Left lateral elbow, well healed.    Atrophy  None    Palpation:  + TTP at right ulnar wrist most prominent at TFCC  + TTP to right dorsal wrist   + TTP to left medial epicondyle     Range of Motion:  ROM hand full, painless  ROM wrist full, + pain with right wrist flexion, extension, radial deviation, ulnar deviation. + pain at right wrist with resisted wrist extension and resisted wrist flexion.   ROM elbow full. + pain at left medial epicondyle with elbow flexion and extension. + pain at left medial epicondyle with resisted wrist extension, flexion, and pronation. No pain with supination.     Special Tests and Maneuvers:  Finkelstein's Test   Neg  WHAT Test    Neg  Snuff box tenderness   Neg  Vázquez's Test    Neg  Hook of Hamate Tenderness  Neg  Foveal tenderness   Pos right   CMC grind    Neg  Circumduction test   Neg  Tinel's Test - Carpal Tunnel  Neg  Tinel's Test - Cubital Tunnel  Neg  Phalen's Test    Neg  Median Nerve Compression Test Neg  Elbow Flexion Test    Neg    Neurovascular Exam:  Digits WWP, brisk CR < 3s  throughout  NVI motor/LTS to M/R/U nerves, radial pulse 2+    Diagnostics Review:   Imaging: I independently viewed the patient's imaging as well as the radiology report.    My personal interpretation of X-rays AP, lateral, oblique right wrist taken today demonstrate mild basilar thumb joint osteoarthritis with no acute fracture or dislocation.  My personal interpretation of X-rays AP, lateral, oblique left elbow taken today demonstrate well preserved cartilage spaces with no acute fracture or dislocation.    Assessment:   59 y.o. yo male with   Encounter Diagnoses   Name Primary?    Left elbow pain Yes    Right wrist pain     Sprain of right wrist, initial encounter     Medial epicondylitis of elbow, left       Plan:   The patient and I had a thorough discussion today. We discussed the working diagnosis as well as several other potential alternative diagnoses. Treatment options were discussed, both conservative and surgical. Conservative treatment options would include things such as activity modifications, workplace modifications, a period of rest, ice/heat, oral vs topical OTC and prescription anti-inflammatory medications, acetaminophen, occupational therapy to include strengthening and range of motion exercises, splinting/bracing, immobilization, corticosteroid injections for temporary relief. Surgical options were discussed as well.     Ordered MRI without contrast (3T) of right wrist to evaluate for ligamentous tear and TFCC injury for potential surgical planning.   I recommended a wrist titan brace for his right wrist pain, however patient declined due to improper fitting. He will try to buy a similar brace OTC.   Medial epicondylitis: Placed referral to occupational therapy for medial epicondylitis of left elbow. Provided patient with Javier Strap brace to be worn 3 finger widths below elbow for medial epicondylitis and also recommended wrist widget. We discussed steroid injections as a treatment option if  symptoms do not improve with therapy.   Patient will follow up with Dr. Pacheco after MRI for results review   Call with any questions/concerns in the interim    Should the patient's symptoms worsen, persist, or fail to improve they should return for reevaluation.     The patient's pathophysiology was explained in detail with reference to x-rays, models, other visual aids as appropriate. Treatment options were discussed in detail.  Questions were invited and answered to the patient's satisfaction. I reviewed Primary care and other specialty's notes to better coordinate patient's care.    Megan Silvestre PA-C  [facility]     This note was generated with the assistance of ambient listening technology. Verbal consent was obtained by the patient and accompanying visitor(s) for the recording of patient appointment to facilitate this note. I attest to having reviewed and edited the generated note for accuracy, though some syntax or spelling errors may persist. Please contact the author of this note for any clarification.

## 2025-02-21 ENCOUNTER — HOSPITAL ENCOUNTER (OUTPATIENT)
Dept: RADIOLOGY | Facility: HOSPITAL | Age: 60
Discharge: HOME | End: 2025-02-21
Payer: COMMERCIAL

## 2025-02-21 DIAGNOSIS — M25.531 RIGHT WRIST PAIN: ICD-10-CM

## 2025-02-21 DIAGNOSIS — S63.501A SPRAIN OF RIGHT WRIST, INITIAL ENCOUNTER: ICD-10-CM

## 2025-02-21 PROCEDURE — 73221 MRI JOINT UPR EXTREM W/O DYE: CPT | Mod: 26,RT,, | Performed by: RADIOLOGY

## 2025-02-21 PROCEDURE — 73221 MRI JOINT UPR EXTREM W/O DYE: CPT | Mod: TC,RT

## 2025-03-05 ENCOUNTER — TELEPHONE (OUTPATIENT)
Dept: ORTHOPEDICS | Facility: CLINIC | Age: 60
End: 2025-03-05
Payer: COMMERCIAL

## 2025-03-05 NOTE — TELEPHONE ENCOUNTER
Called pt to get reschuled due to  being out of clinic on 3/10 pt states he rather f/u with  told pt that we recommend you still with the provider due to not bouncing around to provider to provider pt states he understand but still wants to be seen sooner as he has been waiting for awhile as is. Told pt I'll have my coworker (jose elias ) reach out to him to se if we can figure out a better solution.

## 2025-03-07 NOTE — TELEPHONE ENCOUNTER
Rescheduled patient to Wednesday 3/12/25. 11AM.     Pain is improved with his ulnar wrist using the brace.   Has some pain of his basilar thumb    Judson Long MS, OTC   Sports Medicine Assistant   [facility]  (P) [phone]  (F) [phone]

## 2025-03-11 NOTE — PROGRESS NOTES
[facility]  History & Physical  Orthopedics    SUBJECTIVE:      Chief Complaint:   Chief Complaint   Patient presents with    Right Wrist - Pain    Left Elbow - Pain       Referring Provider: Megan Silvestre PA-C     Patient is a 60 y.o. right hand dominant male who presents today with complaints of DOI 10/24 - Right wrist hammering, hit ulnar wrist; pain since.    HPI:  Patient presents for evaluation of wrist and elbow pain. His right wrist pain began when he was hammering sideways and felt sudden, severe pain. Initially thinking it was a sprained wrist, he continued working, but pain persisted. He describes the initial pain as feeling like an ice pick in the wrist, occurring daily but now less frequently. Pain is exacerbated by certain movements, particularly when using tools or turning a wrench. He states that it really hurts when doing activities like lifting or turning a wrench. Pain can be severe enough to disrupt sleep, necessitating the use of a wrist brace at night. MRI scans revealed some abnormalities. He also reports numbness in three fingers following the wrist injury, which has since subsided.    He has pain in the thumb area, attributed to an old football injury. This pain varies depending on the thumb's position.    His left elbow pain is located around the bone. He had previous surgery in that area. Pain is more pronounced when straightening the arm. An injection was administered on April 25th by Dr. Jorge Urrutia, which provided relief for several months.    He takes Celebrex for arthritis management, noting a family history of arthritis affecting his mother and sisters. However, the medication does not provide complete relief.    He mentions having two tears in his shoulder, with previous surgery by Dr. Rose, that did not fully resolve the issue, particularly affecting his ability to throw a baseball.    His symptoms impact his ability to work on his hobby of  tinkering with [employer], which he does in long-term. He expresses concern about potential conflicts between medical treatments and upcoming activities, mentioning turkey season opening soon.    PREVIOUS TREATMENTS:  - Corticosteroid injection in the left elbow: April 25th, provided pain relief for several months  - Patient wears a wrist brace at night and sometimes during the day for wrist pain and popping    MEDICATIONS:  - Celebrex: helps but does not completely relieve arthritis symptoms    SURGICAL HISTORY:  - Tennis elbow surgery: left elbow  - Shoulder surgery: performed by Dr. Quan at [facility]  - Carpal tunnel surgery: approximately 2 years ago    FAMILY HISTORY:  - Mother: arthritis  - All sisters: arthritis    WORK STATUS:  - Retired  - Hobby involves working on [employer], which requires using wrenches and other tools  - This activity can cause pain in the wrist, especially when turning a wrench or working on something for extended periods  - The pain sometimes interferes with sleep after a day of working on [employer]         Vitals:    03/12/25 1036   PainSc:   8   PainLoc: Wrist       The patient denies any fevers, chills, N/V, D/C and presents for evaluation.    Past Medical History:   Diagnosis Date    Elevated PSA     High cholesterol     Kidney stone     Urinary tract infection      Past Surgical History:   Procedure Laterality Date    COLON SURGERY      fissure    COLONOSCOPY  11/13/2018         COLONOSCOPY N/A 11/13/2018    Procedure: COLONOSCOPY;  Surgeon: Jorge Urrutia MD;  Location: [facility];  Service: Endoscopy;  Laterality: N/A;    ELBOW SURGERY      NASAL SEPTUM SURGERY      PROSTATE SURGERY      bx    SHOULDER ARTHROSCOPY      UVULECTOMY       Review of patient's allergies indicates:  No Known Allergies  Social History     Social History Narrative    Not on file     Family History   Problem Relation Name Age of Onset    Heart attack Mother      Stroke Mother       Diabetes Mother      Cancer Father renal cell ca         Renal Cancer    Cancer Maternal Aunt      Cancer Maternal Uncle      Cancer Paternal Aunt      Cancer Paternal Uncle      Prostate cancer Neg Hx      Kidney disease Neg Hx         Current Medications[1]    ROS    Review of Systems:  Constitutional: no fever or chills  Eyes: no visual changes  ENT: no nasal congestion or sore throat  Respiratory: no cough or shortness of breath  Cardiovascular: no chest pain  Gastrointestinal: no nausea or vomiting, tolerating diet  Musculoskeletal: pain, soreness, numbness/tingling, and decreased ROM    OBJECTIVE:      Vital Signs (Most Recent):  There were no vitals filed for this visit.  There is no height or weight on file to calculate BMI.    Physical Exam    Physical Exam:  Constitutional: The patient appears well-developed and well-nourished. No distress.   Head: Normocephalic and atraumatic.   Nose: Nose normal.   Eyes: Conjunctivae and EOM are normal.   Neck: No tracheal deviation present.   Cardiovascular: Normal rate and intact distal pulses.    Pulmonary/Chest: Effort normal. No respiratory distress.   Abdominal: There is no guarding.   Lymphatic: Negative for adenopathy   Neurological: The patient is alert.   Psychiatric: The patient has a normal mood and affect.     Bilateral Hand/Wrist Examination:  Observation/Inspection:  Swelling  right ulnar wrist, right basilar thumb    Deformity  none  Discoloration  none     Scars   none    Atrophy  none    HAND/WRIST EXAMINATION:  RUE: right wrist decreased ROM flexion and extension, + swelling ulnar wrist, +fovea sign, + ulnar impaction test, + press test, negative ECU subluxation, Positive grind test with telescoping pain right basilar thumb joint, 5/5 thenar and intrinsic musculature strength,  otherwise full range of motion hands, wrists and elbows.    LUE: + Tinel's at elbow, + elbow flexion test, + subluxation. Neurovascularly intact. 5/5 thenar and intrinsic  musculature strength,  otherwise full range of motion hands, wrists and elbows.   bilateral ROM hand/wrist/elbow full    Physical Exam               Neurovascular Exam:  Digits WWP, brisk CR < 3s throughout  NVI motor/LTS to M/R/U nerves, radial pulse 2+  2+ biceps and brachioradialis reflexes    Diagnostic studies and other clinical records review:  X-rays AP, lateral and oblique right wrist are independently reviewed by me and shows Eaton stage II basilar thumb arthritis.     X-rays AP, lateral and oblique left elbow are independently reviewed by me and shows mild arthritis of left elbow  MRI:  2/21/25 Right wrist independently reviewed by me shows complex tear of TFCC, basilar thumb arthritis, SL injury and DRUJ effusion.    ASSESSMENT/PLAN:    60 y.o. yo male with   Encounter Diagnoses   Name Primary?    Degenerative TFCC tear, right Yes    Primary osteoarthritis of first carpometacarpal joint of right hand     Cubital tunnel syndrome on left     Arthritis of elbow, left     Chronic pain of right wrist       The patient and I had a thorough discussion today. We discussed the working diagnosis as well as several other potential alternative diagnoses. Treatment options were discussed, both conservative and surgical. Conservative treatment options would include things such as activity modifications, workplace modifications, a period of rest, oral vs topical OTC and prescription anti-inflammatory medications, occupational therapy, splinting/bracing, immobilization, corticosteroid injections, and others. Surgical options were discussed as well.  I have ordered EMG/NCS to assess for LUE cubital tunnel for surgical planning.     -I have offered him a selective injection. I have explained the risks, benefits, and alternatives of the procedure in detail.  The patient voices understanding and all questions have been answered. The patient agrees to proceed as planned. So after a sterile prep of the skin in the normal  fashion the right ulnar wrist injected using a 25 gauge needle with a combination of 1cc 1% plain lidocaine and 40 mg of methylprednisolone.  The patient is cautioned and immediate relief of pain is secondary to the local anesthetic and will be temporary.  After the anesthetic wears off there may be a increase in pain that may last for a few hours or a few days and they should use ice to help alleviate this flair up of pain. Patient tolerated the procedure well.    Will call in 2 weeks to follow up for steroid injection efficacy or sooner for any worsening of symptoms  F/U after EMG/NCS for results review  OT: begin occupational therapy - order placed today.  Call with any questions/concerns in the interim       The patient's pathophysiology was explained in detail with reference to x-rays, models, other visual aids as appropriate.  Treatment options were discussed in detail.  Questions were invited and answered to the patient's satisfaction. I reviewed Primary care , and other specialty's notes to better coordinate patient's care.          Francheska Pahceco MD    Please be aware that this note has been generated with the assistance of [employer] voice-to-text.  Please excuse any spelling or grammatical errors.  This note was generated with the assistance of ambient listening technology. Verbal consent was obtained by the patient and accompanying visitor(s) for the recording of patient appointment to facilitate this note. I attest to having reviewed and edited the generated note for accuracy, though some syntax or spelling errors may persist. Please contact the author of this note for any clarification.           [1]   Current Outpatient Medications:     aspirin (ECOTRIN) 81 MG EC tablet, Take 81 mg by mouth once daily., Disp: , Rfl:     celecoxib (CELEBREX) 200 MG capsule, Take 1 capsule (200 mg total) by mouth once daily., Disp: 90 capsule, Rfl: 1    cetirizine (ZYRTEC) 10 MG tablet, Take 1 tablet (10 mg total) by mouth  once daily., Disp: 30 tablet, Rfl: 1    fluticasone propionate (FLONASE) 50 mcg/actuation nasal spray, 1 spray (50 mcg total) by Each Nostril route once daily., Disp: 16 g, Rfl: 3    omeprazole (PRILOSEC) 40 MG capsule, TAKE 1 CAPSULE BY MOUTH EVERY DAY, Disp: 90 capsule, Rfl: 3    rosuvastatin (CRESTOR) 20 MG tablet, Take 1 tablet (20 mg total) by mouth once daily., Disp: 90 tablet, Rfl: 1    triamcinolone acetonide 0.1% (KENALOG) 0.1 % cream, Apply topically 2 (two) times daily., Disp: 45 g, Rfl: 5

## 2025-03-12 ENCOUNTER — OFFICE VISIT (OUTPATIENT)
Dept: ORTHOPEDICS | Facility: CLINIC | Age: 60
End: 2025-03-12
Payer: COMMERCIAL

## 2025-03-12 DIAGNOSIS — M25.531 CHRONIC PAIN OF RIGHT WRIST: ICD-10-CM

## 2025-03-12 DIAGNOSIS — G56.22 CUBITAL TUNNEL SYNDROME ON LEFT: ICD-10-CM

## 2025-03-12 DIAGNOSIS — M18.11 PRIMARY OSTEOARTHRITIS OF FIRST CARPOMETACARPAL JOINT OF RIGHT HAND: ICD-10-CM

## 2025-03-12 DIAGNOSIS — M19.022 ARTHRITIS OF ELBOW, LEFT: ICD-10-CM

## 2025-03-12 DIAGNOSIS — G89.29 CHRONIC PAIN OF RIGHT WRIST: ICD-10-CM

## 2025-03-12 DIAGNOSIS — M24.131 DEGENERATIVE TFCC TEAR, RIGHT: Primary | ICD-10-CM

## 2025-03-12 PROCEDURE — 1159F MED LIST DOCD IN RCRD: CPT | Mod: CPTII,S$GLB,, | Performed by: ORTHOPAEDIC SURGERY

## 2025-03-12 PROCEDURE — 99204 OFFICE O/P NEW MOD 45 MIN: CPT | Mod: 25,S$GLB,, | Performed by: ORTHOPAEDIC SURGERY

## 2025-03-12 PROCEDURE — 99999 PR PBB SHADOW E&M-EST. PATIENT-LVL III: CPT | Mod: PBBFAC,,, | Performed by: ORTHOPAEDIC SURGERY

## 2025-03-12 PROCEDURE — 1160F RVW MEDS BY RX/DR IN RCRD: CPT | Mod: CPTII,S$GLB,, | Performed by: ORTHOPAEDIC SURGERY

## 2025-03-12 PROCEDURE — 20605 DRAIN/INJ JOINT/BURSA W/O US: CPT | Mod: RT,S$GLB,, | Performed by: ORTHOPAEDIC SURGERY

## 2025-03-12 RX ADMIN — METHYLPREDNISOLONE ACETATE 40 MG: 40 INJECTION, SUSPENSION INTRA-ARTICULAR; INTRALESIONAL; INTRAMUSCULAR; SOFT TISSUE at 10:03

## 2025-03-12 NOTE — PATIENT INSTRUCTIONS
Today, you saw Dr. Pacheco and were diagnosed with    Encounter Diagnoses   Name Primary?    Degenerative TFCC tear, right Yes    Primary osteoarthritis of first carpometacarpal joint of right hand     Cubital tunnel syndrome on left     Arthritis of elbow, left        We decided the next step(s) in figuring this out is/are  OT: begin asap  EMG/NCS - will get you a sooner appt.       Thank you for allowing me to participate in your care.  We will see you back after EMG/NCS.     Electromyography (EMG) measures the muscle response or electrical activity in   response to nerve stimulation of the muscle. The test is used to help detect   neuromuscular abnormalities.     There are two parts to this procedure: The first part is called a nerve   conduction study. This tests the speed of the electrical signal through a   nerve, which helps to assess whether any damage has occurred to a specific   nerve. During this portion of the test, the nerves will be stimulated in   different areas and the patient will experience a small electrical shock on the   surface of the skin. The second part of the study is the actual EMG, which   measures the electrical activity in response to activation of the muscle. There   are no electrical shocks administered during this part of the procedure, but   the test does involve inserting a small needle through the skin and into the   muscle. This may cause some temporary discomfort but is a necessary step   because it produces the information needed to diagnose the problem.       [facility]  Francheska Pacheco MD        Your hand  was injected with two medications today:   Numbing medicine (lidocaine)   Corticosteroid (Depo-Medrol).    *The numbing medicine works immediately and works for a few hours. The steroid medication could take up to a week to work.     FAQ:  You may notice that your pain returns or even worsens after the numbing medicine wears off.    If pain worsens, treat with  ice 30 minutes on and 10 minutes off the area, over the counter or prescription anti-inflammatories or tylenol and rest.    Call for any redness, fevers, chills or inability to move the joint. Call the office if pain does not improve in 2-3 days.   If you have diabetes, the steroid can temporarily increase your blood sugar. This can last up to 1-2 weeks.   Be mindful of your diet and monitor your blood sugar at home  If you have any concerns about your blood sugar levels please contact your primary care physician     Thank you for your attention to these instructions. If you have questions, do not hesitate to reach out through the portal or call [phone]

## 2025-03-18 ENCOUNTER — CLINICAL SUPPORT (OUTPATIENT)
Dept: REHABILITATION | Facility: HOSPITAL | Age: 60
End: 2025-03-18
Attending: ORTHOPAEDIC SURGERY
Payer: COMMERCIAL

## 2025-03-18 DIAGNOSIS — M25.522 PAIN IN LEFT ELBOW: ICD-10-CM

## 2025-03-18 DIAGNOSIS — M25.531 PAIN IN RIGHT WRIST: ICD-10-CM

## 2025-03-18 DIAGNOSIS — M79.644 PAIN OF RIGHT THUMB: Primary | ICD-10-CM

## 2025-03-18 PROBLEM — M25.521 PAIN IN RIGHT ELBOW: Status: ACTIVE | Noted: 2025-03-18

## 2025-03-18 PROCEDURE — 97165 OT EVAL LOW COMPLEX 30 MIN: CPT

## 2025-03-18 NOTE — PROGRESS NOTES
"  Outpatient Rehab    Occupational Therapy Evaluation    Patient Name: Daniel Vogel  MRN: [id number]  YOB: 1965  Encounter Date: 3/18/2025    Therapy Diagnosis:   Encounter Diagnoses   Name Primary?    Pain of right thumb Yes    Pain in left elbow     Pain in right wrist      Physician: Francheska Pacheco MD    Physician Orders: Eval and Treat  Medical Diagnosis: Degenerative TFCC tear, right  Primary osteoarthritis of first carpometacarpal joint of right hand  Cubital tunnel syndrome on left    Visit # / Visits Authorized: 1 / 1  Date of Evaluation:  3/18/2025   Insurance Authorization Period: 3/12/2025 to 3/12/2026  Plan of Care Certification:  3/18/2025 to 5/13/2025      Time In: 0900   Time Out: 0955  Total Time: 55   Total Billable Time: 50    Intake Outcome Measure for FOTO Survey    Therapist reviewed FOTO scores for Daniel Vogel on 3/18/2025.   FOTO report - see Media section or FOTO account episode details.   [id number]  = Lobby Code  Intake Score: 64%    Precautions       Standard.    Subjective   History of Present Illness  Daniel is a 60 y.o. male who reports to occupational therapy with a chief concern of Pain in right wrist.and left medial elbow..     The patient reports a medical diagnosis of M24.131 (ICD-10-CM) - Degenerative TFCC tear, right  M18.11 (ICD-10-CM) - Primary osteoarthritis of first carpometacarpal joint of right hand  G56.22 (ICD-10-CM) - Cubital tunnel syndrome on left.  Patient reports a surgery of None for this condition..  Diagnostic tests related to this condition: X-ray.        Dominant Hand: Right  History of Present Condition/Illness: Daniel reports right wrist pain began in September or October of 2024 while using a hammer at his camp. He reports that right thumb pain is from an injury sustained in high school. Stated "It's been nagging me since." Left elbow pain began "several months' ago. Received injection in elbow with temporary relief. Recently received " "injection int he right wrist and is experiencing  goo drelief with random, sharp, fleeing pains in the wrist. EMG scheduled for the L UE on 4/22/2025.    Activities of Daily Living  Social history was obtained from Patient.    General Prior Level of Function Comments: Full Use  General Current Level of Function Comments: Limited due to pain.  Patient Responsibilities: Driving, Financial management, Home management, Laundry, Meal prep, Personal ADL, Shopping, Yard work    Previously independent with activities of daily living? Yes     Currently independent with activities of daily living? Yes          Previously independent with instrumental activities of daily living? Yes     Currently independent with instrumental activities of daily living? Yes     Reports independence all activities with right wrist pain and left elbow pain.          Pain     Patient reports a current pain level of 4/10. Pain at best is reported as 0/10. Pain at worst is reported as 8/10.   Location: Pain R wirst pain described as above. Left: Elbow Avg Pain= 4-5, lowest 0, highest 8-9.  Clinical Progression (since onset): Stable  Pain Qualities: Sharp, Aching, Other (Comment)  Other Pain Qualities: "Continuos ache in right elbow." Pain in right is activity dependent.  Pain-Relieving Factors: Other (Comment)  Other Pain-Relieving Factors: OTC pain meds and activity modification.  R wrist is aggravated by lifting, sherwin  in the plane of wrist r/id such as lifting a cup. Indiciates that the pain in located mostly in ulnar wrist and dorso-ulnar-hand proximally. L medial elbow pain increases with extension and lifting and with raising hand over head.      Treatment History  Treatments  Previously Received Treatments: No  Currently Receiving Treatments: No    Living Arrangements  Living Situation  Living Arrangements: Spouse/significant other  Support Systems: Spouse/significant other        Employment  Employment Status: Retired          Past Medical " History/Physical Systems Review:   Daniel Vogel  has a past medical history of Elevated PSA, High cholesterol, Kidney stone, and Urinary tract infection.    Daniel Vogel  has a past surgical history that includes Colon surgery; Prostate surgery; Elbow surgery; Shoulder arthroscopy; Nasal septum surgery; Uvulectomy; Colonoscopy (11/13/2018); and Colonoscopy (N/A, 11/13/2018).    Daniel has a current medication list which includes the following prescription(s): aspirin, celecoxib, cetirizine, fluticasone propionate, omeprazole, rosuvastatin, and triamcinolone acetonide 0.1%.    Review of patient's allergies indicates:  No Known Allergies     Objective   Elbow Observations     Tender upon palpation to medial aspect of the left elbow. No other visible abnormalities either UE.       Upper Extremity Sensation            Daniel margi sensory deficits B hands. Reports 3 day hx of numbness ring and small fingers a while back.         Elbow Circumferential Measurements  Right Location of Measurement Right  (cm) Left Location of  Measurement Left  (cm)   At Joint Line   At Joint Line       cm Above Joint Line     cm Above Joint Line       cm Below Joint Line     cm Below Joint Line     None    Wrist/Hand Swelling Details     None           Elbow Palpation     No tenderness noted wrist or thumb.        Left medial elbow tender upon palpation.        Elbow/Forearm Range of Motion   Left Elbow/Forearm   Active (deg) Passive (deg) Pain   Flexion     Yes   Extension     Yes   Forearm Pronation         Forearm Supination             Right elbow presents with full pain-free arom. Left elbow presents with full arom with effort and with pain. B FA AROM WNL.     Wrist Range of Motion  Right Wrist   Active (deg) Passive (deg) Pain Comment   Flexion 64   Yes     Extension 67         Radial Deviation 25         Ulnar Deviation 34   Yes       Left Wrist   Active (deg) Passive (deg) Pain Comment   Flexion 73         Extension 70          Radial Deviation 28         Ulnar Deviation 39                     Bilateral hand arom WNL.              Elbow Strength   Right Strength Right Pain Left Strength Left  Pain   Flexion (C6)     5     Extension (C7)     5          Forearm Strength   Right Strength Right Pain Left Strength Left  Pain   Pronation 5   5     Supination 5   5         Wrist Strength - Planes of Motion   Right Strength Right Pain Left Strength Left  Pain   Flexion 5 Yes 5     Extension 5 Yes 5     Radial Deviation 5   5     Ulnar Deviation (C8) 5   5       Wrist Strength Details     Pain right dorso-ulnar wrist with resistance to right extension and volar-ulnar wrist with resistance to wrist flexion.     Hand Strength Details     Bilateral Hands 5/5.     Right  Strength  Right Hand Dynamometer Position: 2  Elbow Position Forearm Position Trial 1 (lbs) Trial 2  (lbs) Trial 3  (lbs) Average  (lbs) Pain   Flexed Neutral 95               Left  Strength  Left Hand Dynamometer Position: 2  Elbow Position Forearm Position Trial 1 (lbs) Trial 2 (lbs) Trial 3 (lbs) Average (lbs) Pain   Flexed Neutral 92               Right  Strength - Alternate Positions  Right Hand Dynamometer Position: 2  Elbow Position Forearm Position Trial 1 (lbs) Trial 2 (lbs) Trial 3 (lbs) Average (lbs) Pain   Flexed Pronated 85       Yes   Extended Neutral             Extended Pronated             Flexed Supinated 90               Left  Strength - Alternate Positions  Left Hand Dynamometer Position: 2  Elbow Position Forearm Position Trial 1 (lbs) Trial 2 (lbs) Trial 3 (lbs) Average (lbs) Pain   Flexed Pronated 84           Extended Neutral 89       Yes   Extended Pronated             Flexed Supinated 85                        Treatment:       Time Entry(in minutes):  OT Evaluation (Low) Time Entry: 50    Assessment & Plan   Assessment  Daniel presents with a condition of Low complexity.   Presentation of Symptoms: Stable  Will Comorbidities Impact Care: No        Rest and Sleep Limitations: Disrupted sleep pattern  Functional Limitations: Pain with ADLs/IADLs, Manipulating objects, Proprioception                    Prognosis: Fair  Prognosis Details: Duration of condition.    Plan  From an occupational therapy perspective, the patient would benefit from: Skilled Rehab Services    Planned therapy interventions include: Therapeutic exercise, Therapeutic activities, Neuromuscular re-education, Manual therapy, and Orthotic management and training.    Planned modalities to include: Fluidotherapy, Paraffin bath, and Ultrasound.                   This plan was discussed with Patient.   Discussion participants: Agreed Upon Plan of Care             Patient's spiritual, cultural, and educational needs considered and patient agreeable to plan of care and goals.           Goals:   Active       Long Term Goals       Patient will report pain 2 out of 10 at worst  during use in IADL's to improve function with ADLs/work/leisure tasks..        Start:  03/18/25    Expected End:  05/13/25            Patient will demonstrate significantly improved functional performance from re-assessment as measured by a 20 ponit Increase in FOTO function score.        Start:  03/18/25    Expected End:  05/13/25               Short Term Goals       Patient will report pain 2 out of 10 at worst in right wrist and left elbow during use in light ADL's to improve function in ADLs.       Start:  03/18/25    Expected End:  04/15/25            Patient will demonstrate significantly improved functional performance from re-assessment as measured by a 10 point Increase in FOTO function score.        Start:  03/18/25    Expected End:  05/13/25                Hiwot Bella OT

## 2025-03-20 ENCOUNTER — CLINICAL SUPPORT (OUTPATIENT)
Dept: REHABILITATION | Facility: HOSPITAL | Age: 60
End: 2025-03-20
Payer: COMMERCIAL

## 2025-03-20 DIAGNOSIS — M25.522 PAIN IN LEFT ELBOW: ICD-10-CM

## 2025-03-20 DIAGNOSIS — M25.531 PAIN IN RIGHT WRIST: ICD-10-CM

## 2025-03-20 DIAGNOSIS — M79.644 PAIN OF RIGHT THUMB: Primary | ICD-10-CM

## 2025-03-20 PROCEDURE — 97022 WHIRLPOOL THERAPY: CPT

## 2025-03-20 PROCEDURE — 97110 THERAPEUTIC EXERCISES: CPT

## 2025-03-20 NOTE — PROGRESS NOTES
Outpatient Rehab    Occupational Therapy Visit    Patient Name: Daniel Vogel  MRN: [id number]  YOB: 1965  Encounter Date: 3/20/2025    Therapy Diagnosis:   Encounter Diagnoses   Name Primary?    Pain of right thumb Yes    Pain in left elbow     Pain in right wrist      Physician: Francheska Pacheco MD    Physician Orders: Eval and Treat  Medical Diagnosis: Other articular cartilage disorders, right wrist  Unilateral primary osteoarthritis of first carpometacarpal joint, right hand  Lesion of ulnar nerve, left upper limb    Visit # / Visits Authorized: 1 / 20  Date of Evaluation:  3/18/2025  Insurance Authorization Period: 3/20/2025 to 7/30/2025  Plan of Care Certification:    3/18/2025 to 5/13/2025     DOI: September or October of 2024      Time In: 0914   Time Out: 0953  Total Time: 39   Total Billable Time: 39    FOTO: [id number]  = Lobby Code   Intake Score: 64%  Survey Score 1:  %  Survey Score 2:  %         Subjective      Pain reported as 3/10.      Objective           Treatment:  Therapeutic Exercise  TE 1: Rgiht A ROM wrist flex/ext.10 reps,  radial/ulnar deviation 10 reps  TE 2: Right Fist x 10 reps  TE 3: Wrist  isomettics with red bar 4 ways 10 reps, oscillations, 2 directions, 1 min each  Balance/Neuromuscular Re-Education  NMR 1: Ulnar Never Glides 10 reps  Left Elbow Educated in HEP  Modalities  Moist Heat (min): Pt received moist heat X 10 minutes to L Ue  Fluidotherapy: Fluidotherapy: To involved hand for 10 min, continuous air, 115 deg, air speed 50 to decrease pain, edema & scar tissue, sensory re- education, and increased tissue extensibility prior to therex to R Wrist/hand    Time Entry(in minutes):  Hot/Cold Pack Time Entry: 10  Whirlpool Time Entry: 10  Therapeutic Exercise Time Entry: 29    Assessment & Plan   Assessment:         Patient will continue to benefit from skilled outpatient occupational therapy to address the deficits listed in the problem list box on initial  evaluation, provide pt/family education and to maximize pt's level of independence in the home and community environment.     Patient's spiritual, cultural, and educational needs considered and patient agreeable to plan of care and goals.           Plan: Continue per OT POC with focus on return to funcitonal independence    Goals:   Active       Long Term Goals       Patient will report pain 2 out of 10 at worst  during use in IADL's to improve function with ADLs/work/leisure tasks..        Start:  03/18/25    Expected End:  05/13/25            Patient will demonstrate significantly improved functional performance from re-assessment as measured by a 20 ponit Increase in FOTO function score.        Start:  03/18/25    Expected End:  05/13/25               Short Term Goals       Patient will report pain 2 out of 10 at worst in right wrist and left elbow during use in light ADL's to improve function in ADLs.       Start:  03/18/25    Expected End:  04/15/25            Patient will demonstrate significantly improved functional performance from re-assessment as measured by a 10 point Increase in FOTO function score.        Start:  03/18/25    Expected End:  05/13/25                Hiwot Bella, OT

## 2025-03-20 NOTE — PATIENT INSTRUCTIONS
[facility], OCCUPATIONAL THERAPY  HOME EXERCISE PROGRAM                   Complete 10 repetitions of each exercise 4 times a day:                [facility], OCCUPATIONAL THERAPY  HOME EXERCISE PROGRAM       Complete 5- 10 repetitions of each exercise 2-4 times a day.              Extend your arm in front of you keeping your elbow straight, and bend the wrist and fingers.  Extend your wrist and fingers.  Bend your elbow.  Bring your arm out to the side, bend your wrist and fingers.  Rotate your arm so that your palm is facing behind you.  Tilt your head to the opposite side.        If any of these exercises aggravate your hands, stop, rest and try returning again to the previous exercise performed without pain. Proceed at your own pace using pain tolerance as your guide.        ______________________________________   Hand Therapist                                Copyright © I. All rights reserved.     Therapist: Hiwot Bales, DAWSON/L, CHT

## 2025-03-22 RX ORDER — METHYLPREDNISOLONE ACETATE 40 MG/ML
40 INJECTION, SUSPENSION INTRA-ARTICULAR; INTRALESIONAL; INTRAMUSCULAR; SOFT TISSUE
Status: DISCONTINUED | OUTPATIENT
Start: 2025-03-12 | End: 2025-03-22 | Stop reason: HOSPADM

## 2025-03-22 NOTE — PROCEDURES
Intermediate Joint Aspiration/Injection: R radiocarpal    Date/Time: 3/12/2025 10:30 AM    Performed by: Francheska Pacheco MD  Authorized by: Francheska Pacheco MD    Consent Done?:  Yes (Verbal)  Indications:  Pain  Timeout: Prior to procedure the correct patient, procedure, and site was verified      Location:  Wrist  Site:  R radiocarpal  Prep: Patient was prepped and draped in usual sterile fashion    Needle size:  25 G  Medications:  40 mg methylPREDNISolone acetate 40 mg/mL  Patient tolerance:  Patient tolerated the procedure well with no immediate complications

## 2025-03-25 ENCOUNTER — CLINICAL SUPPORT (OUTPATIENT)
Dept: REHABILITATION | Facility: HOSPITAL | Age: 60
End: 2025-03-25
Payer: COMMERCIAL

## 2025-03-25 DIAGNOSIS — M25.522 PAIN IN LEFT ELBOW: ICD-10-CM

## 2025-03-25 DIAGNOSIS — M25.531 PAIN IN RIGHT WRIST: ICD-10-CM

## 2025-03-25 DIAGNOSIS — M79.644 PAIN OF RIGHT THUMB: Primary | ICD-10-CM

## 2025-03-25 PROCEDURE — 97110 THERAPEUTIC EXERCISES: CPT

## 2025-03-25 NOTE — PATIENT INSTRUCTIONS
[facility], OCCUPATIONAL THERAPY  HOME EXERCISE PROGRAM     Complete the following strengthening exercises 2x/day.  Hold each position x3 seconds then relax. Complete 2/10 repetitions each.     Resisted Wrist Extension   With forearm resting palm down, resist upward movement   of hand with other hand. (HAVE HAND FISTED AND FA/WRIST resting on table)      Resisted Wrist Flexion   With forearm resting palm down on table top, press fisted hand into the table.        Resisted Wrist Radial Deviation  With forearm resting with thumb up, use other hand to   resist upward movement of hand at wrist.       Resisted Wrist Ulnar Deviation  With forearm resting thumb up on table, press fist into the table.    Copyright © [facility]. All rights reserved.     Therapist: CAROL Campbell CHT      [facility]  OCCUPATIONAL THERAPY  HOME EXERCISE PROGRAM     Complete the following exercises with 10 repetitions, 2x/day.         Start with erect posture. Lower shoulders.       Maintaining erect posture, draw shoulders back while bringing elbows back and inward.      Copyright © [facility]. All rights reserved.       CAROL Campbell CHT  Certified Hand Therapist  Occupational Therapist

## 2025-03-25 NOTE — PROGRESS NOTES
Outpatient Rehab    Occupational Therapy Visit    Patient Name: Daniel Vogel  MRN: [id number]  YOB: 1965  Encounter Date: 3/25/2025    Therapy Diagnosis:   Encounter Diagnoses   Name Primary?    Pain of right thumb Yes    Pain in left elbow     Pain in right wrist      Physician: Francheska Pacheco MD    Physician Orders: Eval and Treat  Medical Diagnosis: Other articular cartilage disorders, right wrist  Unilateral primary osteoarthritis of first carpometacarpal joint, right hand  Lesion of ulnar nerve, left upper limb    Visit # / Visits Authorized: 2 / 20  Insurance Authorization Period: 3/20/2025 to 7/30/2025  Date of Evaluation: 3/18/2025  Plan of Care Certification: 3/18/2025 to 5/13/2025      Time In: 0905   Time Out: 1000  Total Time: 55   Total Billable Time: 55    FOTO:  Intake Score: 64%  Survey Score 1:  %  Survey Score 2:  %         Subjective   Reports that right wrist is less sence receiving injection recenty. Reports that the left elbow is status quo..         Objective           Treatment:  Therapeutic Exercise  TE 1: Rgiht A ROM wrist flex/ext.10 reps,  radial/ulnar deviation 10 reps  TE 2: Right Fist x 10 reps  TE 3: Wrist  isomettics with red bar 4 ways 10 reps, 2/10 reps ea  TE 4: oscillations, 2 directions, 1 min each  TE 5: Elbow AROM: FA in 3 planes, 10reps each plane  TE 6: Shoulder depressions, shoulder retractions 10 each. Added to HEP.  TE 7: Added isometrics to HEP.  Modalities  Moist Heat (min): Pt received moist heat X 10 minutes to L elbow and R wirst.    Time Entry(in minutes):  Hot/Cold Pack Time Entry: 10  Therapeutic Exercise Time Entry: 45    Assessment & Plan   Assessment:    Evaluation/Treatment Tolerance: Patient tolerated treatment well    Patient will continue to benefit from skilled outpatient occupational therapy to address the deficits listed in the problem list box on initial evaluation, provide pt/family education and to maximize pt's level of  independence in the home and community environment.     Patient's spiritual, cultural, and educational needs considered and patient agreeable to plan of care and goals.           Plan: Continue per OT POC with focus on return to funcitonal independence    Goals:   Active       Long Term Goals       Patient will report pain 2 out of 10 at worst  during use in IADL's to improve function with ADLs/work/leisure tasks..        Start:  03/18/25    Expected End:  05/13/25            Patient will demonstrate significantly improved functional performance from re-assessment as measured by a 20 ponit Increase in FOTO function score.        Start:  03/18/25    Expected End:  05/13/25               Short Term Goals       Patient will report pain 2 out of 10 at worst in right wrist and left elbow during use in light ADL's to improve function in ADLs.       Start:  03/18/25    Expected End:  04/15/25            Patient will demonstrate significantly improved functional performance from re-assessment as measured by a 10 point Increase in FOTO function score.        Start:  03/18/25    Expected End:  05/13/25                Hiwot Bella OT

## 2025-04-04 ENCOUNTER — PATIENT MESSAGE (OUTPATIENT)
Dept: ORTHOPEDICS | Facility: CLINIC | Age: 60
End: 2025-04-04
Payer: COMMERCIAL

## 2025-04-11 ENCOUNTER — OFFICE VISIT (OUTPATIENT)
Dept: INTERNAL MEDICINE | Facility: CLINIC | Age: 60
End: 2025-04-11
Payer: COMMERCIAL

## 2025-04-11 VITALS
BODY MASS INDEX: 29.87 KG/M2 | SYSTOLIC BLOOD PRESSURE: 116 MMHG | HEART RATE: 68 BPM | OXYGEN SATURATION: 96 % | HEIGHT: 71 IN | WEIGHT: 213.38 LBS | DIASTOLIC BLOOD PRESSURE: 76 MMHG | RESPIRATION RATE: 18 BRPM

## 2025-04-11 DIAGNOSIS — F41.1 GAD (GENERALIZED ANXIETY DISORDER): Primary | ICD-10-CM

## 2025-04-11 DIAGNOSIS — F32.1 CURRENT MODERATE EPISODE OF MAJOR DEPRESSIVE DISORDER WITHOUT PRIOR EPISODE: ICD-10-CM

## 2025-04-11 DIAGNOSIS — G47.33 OSA (OBSTRUCTIVE SLEEP APNEA): ICD-10-CM

## 2025-04-11 PROCEDURE — 99999 PR PBB SHADOW E&M-EST. PATIENT-LVL IV: CPT | Mod: PBBFAC,,, | Performed by: FAMILY MEDICINE

## 2025-04-11 RX ORDER — CLONAZEPAM 0.5 MG/1
0.5 TABLET ORAL 2 TIMES DAILY PRN
Qty: 30 TABLET | Refills: 0 | Status: SHIPPED | OUTPATIENT
Start: 2025-04-11 | End: 2026-04-11

## 2025-04-11 RX ORDER — ESCITALOPRAM OXALATE 10 MG/1
10 TABLET ORAL DAILY
Qty: 30 TABLET | Refills: 0 | Status: SHIPPED | OUTPATIENT
Start: 2025-04-11

## 2025-04-11 NOTE — PROGRESS NOTES
Mr. Vogel is a 60 yr old male with a past medical history of BPH, dyslipidemia, JOY, osteoarthritis, and GERD. He presents to the clinic for a 4 week follow up. He states that for the past month he has been experiencing intermittent loss of voice and hoarseness. He notices the symptoms when speaking in a high pitch tone. The symptoms only last a few seconds and resolve. He has not had these symptoms in the past. He denies throat pain, wheezing, shortness of breath, acid reflux, and allergy symptoms.   History of Present Illness    HPI:  Patient presents for a follow-up visit regarding previously reported chest pain and to discuss concerns about his mother's health condition. Patient reports ongoing chest tightness, which was the reason for his previous visit 6 months ago. At that time, he underwent a cardiac workup with normal results. His mother is currently in hospice care due to heart failure, specifically a mitral valve regurgitation that cannot be surgically corrected. She is excessively somnolent and unable to maintain wakefulness, which he is uncertain if it is due to hypoxia or other factors. He expresses significant emotional distress about his mother's condition, indicating anticipation of further decline and difficulty coping. He attended a  the day before for someone who had a similar condition to his mother's, which has exacerbated his emotional burden.    MEDICATIONS:  Patient is on Naproxen.    MEDICAL HISTORY:  He has a history of chest pain, which was evaluated 6 months ago. A cardiac workup was performed as part of this evaluation.    FAMILY HISTORY:  Family history is significant for mother with heart failure and mitral valve regurgitation. She is currently in hospice care.    TEST RESULTS:  Patient underwent a cardiac workup 6 months ago, with normal results.    Review of Systems   Constitutional:  Negative for fever.   HENT:  Negative for rhinorrhea. Negative for sore throat.     Respiratory:  Negative for shortness of breath and wheezing.    Cardiovascular:  Negative for chest pain.   Gastrointestinal:  Negative for abdominal pain and vomiting.      Physical Exam  Constitutional:       Appearance: He is well-developed.   HENT:      Head: Normocephalic and atraumatic.      Right Ear: Tympanic membrane normal.      Left Ear: Tympanic membrane normal.      Nose: No congestion or rhinorrhea.      Comments: S/P uvulectomy.       Mouth/Throat:      Mouth: Mucous membranes are moist.   Cardiovascular:      Rate and Rhythm: Normal rate and regular rhythm.      Pulses: Normal pulses.      Heart sounds: Normal heart sounds. No murmur heard.     No friction rub. No gallop.   Pulmonary:      Effort: Pulmonary effort is normal.      Breath sounds: Normal breath sounds.   Abdominal:      General: Abdomen is flat.      Palpations: Abdomen is soft.      Tenderness: There is no abdominal tenderness.   Musculoskeletal:         General: No tenderness.      Right lower leg: No edema.      Left lower leg: No edema.   Neurological:      General: No focal deficit present.      Mental Status: He is oriented to person, place, and time.      Deep Tendon Reflexes: Reflexes are normal and symmetric.   Psychiatric:         Mood and Affect: Mood normal.     Plan of Care:  1. EDU (generalized anxiety disorder)  -     EScitalopram oxalate (LEXAPRO) 10 MG tablet; Take 1 tablet (10 mg total) by mouth once daily.  Dispense: 30 tablet; Refill: 0  -     clonazePAM (KLONOPIN) 0.5 MG tablet; Take 1 tablet (0.5 mg total) by mouth 2 (two) times daily as needed for Anxiety.  Dispense: 30 tablet; Refill: 0    2. Current moderate episode of major depressive disorder without prior episode  -     EScitalopram oxalate (LEXAPRO) 10 MG tablet; Take 1 tablet (10 mg total) by mouth once daily.  Dispense: 30 tablet; Refill: 0  -     clonazePAM (KLONOPIN) 0.5 MG tablet; Take 1 tablet (0.5 mg total) by mouth 2 (two) times daily as needed for  Anxiety.  Dispense: 30 tablet; Refill: 0    3. JOY (obstructive sleep apnea)  Overview:  Polysomnogram reveals patient has obstructive sleep apnea.  He just finished taking his CPAP titration study.  Hopefully he will be on CPAP soon    Orders:  -     CPAP/BIPAP SUPPLIES     Assessment & Plan    R07.89 Other chest pain  F32.9 Major depressive disorder, single episode, unspecified  F41.9 Anxiety disorder, unspecified  Z82.49 Family history of ischemic heart disease and other diseases of the circulatory system    IMPRESSION:  - Assessed emotional state in light of mother's terminal illness and recent similar experiences with friends' family members.  - Determined need for short-term anxiolytic and antidepressant therapy to help cope with current stressors.    CHEST PAIN:  - Monitored the patient's ongoing chest tightness.  - Evaluated previous cardiac workup, which showed normal results.    MAJOR DEPRESSIVE DISORDER:  - Prescribed Lexapro to be taken daily for depression.  - Provided 1 month's supply to assess tolerability.  - Explained expected timeline of up to 2 weeks to see improvements.  - Scheduled follow-up visit in 2 weeks to assess medication effectiveness.    ANXIETY DISORDER:  - Acknowledged the patient's anxiety and initiated medication treatment.  - Prescribed Clonazepam to be taken once or twice daily as needed for anxiety.  - Provided 1 month's supply to assess tolerability.  - Anticipated reduced need for Clonazepam as Lexapro takes effect.    FAMILY HISTORY OF HEART DISEASE:  - Noted mother has heart failure with mitral valve regurgitation.  - Discussed the details of mother's heart condition and treatment options with the patient.  - Assessed the patient's emotional distress due to mother's health condition.  - Discussed the role of hospice care in providing comfort and support for both mother and family members.    FOLLOW-UP:  - Follow up in 2 weeks to assess medication efficacy and overall  well-being.

## 2025-04-13 DIAGNOSIS — K21.9 GASTROESOPHAGEAL REFLUX DISEASE WITHOUT ESOPHAGITIS: ICD-10-CM

## 2025-04-13 NOTE — TELEPHONE ENCOUNTER
Care Due:                  Date            Visit Type   Department     Provider  --------------------------------------------------------------------------------                                EP -                              PRIMARY      [facility]  Jorge Hogan  Last Visit: 04-      CARE ([facility])   MEDICINE JACOB Urrutia                              EP -                              PRIMARY      [facility]  Jorge Hogan  Next Visit: 04-      CARE ([facility])   MEDICINE [facility]                                                            Last  Test          Frequency    Reason                     Performed    Due Date  --------------------------------------------------------------------------------    CBC.........  12 months..  celecoxib................  10-   10-    [facility] Embedded Care Due Messages. Reference number: [id number].   4/13/2025 9:32:39 AM CDT

## 2025-04-14 RX ORDER — OMEPRAZOLE 40 MG/1
40 CAPSULE, DELAYED RELEASE ORAL EVERY MORNING
Qty: 90 CAPSULE | Refills: 3 | Status: SHIPPED | OUTPATIENT
Start: 2025-04-14

## 2025-04-14 NOTE — TELEPHONE ENCOUNTER
LOV:  04/11/2025  Pt requesting refill of:   omeprazole (PRILOSEC) 40 MG capsule   Medication pended     Please advise

## 2025-04-22 ENCOUNTER — PROCEDURE VISIT (OUTPATIENT)
Dept: NEUROLOGY | Facility: CLINIC | Age: 60
End: 2025-04-22
Payer: COMMERCIAL

## 2025-04-22 DIAGNOSIS — G56.22 CUBITAL TUNNEL SYNDROME ON LEFT: ICD-10-CM

## 2025-04-22 PROCEDURE — 95886 MUSC TEST DONE W/N TEST COMP: CPT | Mod: S$GLB,,, | Performed by: PHYSICAL MEDICINE & REHABILITATION

## 2025-04-22 PROCEDURE — 95911 NRV CNDJ TEST 9-10 STUDIES: CPT | Mod: S$GLB,,, | Performed by: PHYSICAL MEDICINE & REHABILITATION

## 2025-04-23 NOTE — PROCEDURES
Test Date:  2025    Patient: marjorie olguin : 1965 Physician: Luisito Munoz D.O.   ID#: [id number] SEX: Male Ref. Phys: Francheska Pacheco MD     HPI:  Evaluate for left ulnar neuropathy    PROCEDURE:  Prior to the procedure, the procedure was discussed in detail with the patient.  All questions were answered, and verbal consent was obtained.  For nerve conduction studies, a combination of surface electrodes, bar electrodes, and/or ring electrodes were used as needed.  For needle EMG, each site was cleaned and prepped in usual fashion with an alcohol pad.  A monopolar needle (28G) was used.  There was no significant bleeding, and bandages were applied as needed.  The procedure was tolerated without adverse effect.  The patient was instructed on post-procedure care including ice if needed for 10-15 minutes up to 4 times/day for any sore muscles.  I discussed with the patient that the data would be reviewed and a report sent to the referring provider, where any follow up questions regarding next steps should be directed.        NCV & EMG Findings:  All nerve conduction studies (as indicated in the following tables) were within normal limits.  All examined muscles (as indicated in the following table) showed no evidence of electrical instability.      IMPRESSIONS:  This is a normal electrophysiologic study of the left upper extremity.       ___________________________  Luisito Munoz D.O.        NCS+  Motor Nerve Results      Latency Amplitude F-Lat Segment Distance CV Comment   Site (ms) Norm (mV) Norm (ms)  (cm) (m/s) Norm    Left Median (APB)   Wrist 3.8  < 4.7 9.4  > 3.8         Elbow 8.4 - 6.6 -  Elbow-Wrist 25 54  > 47    Right Median (APB)   Wrist 4.0  < 4.7 6.4  > 3.8         Elbow 8.5 - 7.0 -  Elbow-Wrist 24 53  > 47    Left Ulnar (ADM)   Wrist 2.8  < 3.7 7.8  > 3.0         Bel Elbow 7.1 - 7.3 -  Bel Elbow-Wrist 24 56  > 52    Abv Elbow 8.8 - 6.9 -  Abv Elbow-Bel Elbow 12 71  > 43    Left  Ulnar (FDI)   Wrist 4.0 - 8.3 -         Bel Elbow 7.8 - 9.1 -  Bel Elbow-Wrist 24 63  > 52    Abv Elbow 9.3 - 8.9 -  Abv Elbow-Bel Elbow 12 80  > 43    Right Ulnar (ADM)   Wrist 2.5  < 3.7 6.4  > 3.0         Bel Elbow 7.9 - 5.5 -  Bel Elbow-Wrist 28 52  > 52    Abv Elbow 9.5 - 5.4 -  Abv Elbow-Bel Elbow 9 56  > 43      Sensory Nerve Results      Start Lat Latency (Peak) Amplitude (P-P) Segment Distance Start CV Comment   Site (ms) Norm (ms) (µV) Norm  (cm) (m/s) Norm    Left Median (Rec:Dig II)   Wrist 2.8  < 3.3 3.7 27  > 8 Wrist-Dig II 14 50  > 42    Right Median (Rec:Dig II)   Wrist 3.3  < 3.3 4.0 17  > 8 Wrist-Dig II 14 42  > 42    Left Ulnar (Rec:Dig V)   Wrist 2.6  < 3.1 3.2 24  > 4 Wrist-Dig V 14 54  > 45    Right Ulnar (Rec:Dig V)   Wrist 2.8  < 3.1 3.3 10  > 4 Wrist-Dig V 14 50  > 45    Left Radial (Rec:Wrist)   Forearm 1.38  < 2.2 1.95 26  > 11 Forearm-Wrist 10 72 -      EMG+     Side Muscle Nerve Root Ins Act Fibs Psw Amp Dur Poly Recrt Int Pat Comment   Left Deltoid Axillary C5-C6 Nml Nml Nml Nml Nml 0 Nml Nml    Left Biceps Musculocut C5-C6 Nml Nml Nml Nml Nml 0 Nml Nml    Left Triceps Radial C6-C8 Nml Nml Nml Nml Nml 0 Nml Nml    Left Pronator Teres Median C6-C7 Nml Nml Nml Nml Nml 0 Nml Nml    Left FDI Ulnar C8-T1 Nml Nml Nml Nml Nml 0 Nml Nml    Left Cervical Parasp (Mid) Rami C4-C6 Nml Nml Nml                 Waveforms:    Motor              Sensory

## 2025-04-28 ENCOUNTER — PATIENT MESSAGE (OUTPATIENT)
Dept: ORTHOPEDICS | Facility: CLINIC | Age: 60
End: 2025-04-28

## 2025-04-28 ENCOUNTER — OFFICE VISIT (OUTPATIENT)
Dept: ORTHOPEDICS | Facility: CLINIC | Age: 60
End: 2025-04-28
Payer: COMMERCIAL

## 2025-04-28 VITALS — HEIGHT: 71 IN | WEIGHT: 213.38 LBS | BODY MASS INDEX: 29.87 KG/M2

## 2025-04-28 DIAGNOSIS — M25.531 CHRONIC PAIN OF RIGHT WRIST: ICD-10-CM

## 2025-04-28 DIAGNOSIS — M18.11 PRIMARY OSTEOARTHRITIS OF FIRST CARPOMETACARPAL JOINT OF RIGHT HAND: ICD-10-CM

## 2025-04-28 DIAGNOSIS — G89.29 CHRONIC PAIN OF RIGHT WRIST: ICD-10-CM

## 2025-04-28 DIAGNOSIS — M77.02 MEDIAL EPICONDYLITIS OF ELBOW, LEFT: Primary | ICD-10-CM

## 2025-04-28 DIAGNOSIS — G56.22 CUBITAL TUNNEL SYNDROME ON LEFT: ICD-10-CM

## 2025-04-28 DIAGNOSIS — M24.131 DEGENERATIVE TFCC TEAR, RIGHT: ICD-10-CM

## 2025-04-28 PROCEDURE — 99999 PR PBB SHADOW E&M-EST. PATIENT-LVL III: CPT | Mod: PBBFAC,,, | Performed by: ORTHOPAEDIC SURGERY

## 2025-04-28 RX ADMIN — METHYLPREDNISOLONE ACETATE 40 MG: 40 INJECTION, SUSPENSION INTRA-ARTICULAR; INTRALESIONAL; INTRAMUSCULAR; SOFT TISSUE at 01:04

## 2025-04-28 NOTE — PROCEDURES
L medial epicondyleIntermediate Joint Aspiration/Injection    Date/Time: 4/28/2025 1:00 PM    Performed by: Francheska Pacheco MD  Authorized by: Francheska Pacheco MD    Consent Done?:  Yes (Verbal)  Indications:  Pain  Timeout: Prior to procedure the correct patient, procedure, and site was verified      Location:  Elbow  Prep: Patient was prepped and draped in usual sterile fashion    Needle size:  25 G  Medications:  40 mg methylPREDNISolone acetate 40 mg/mL  Patient tolerance:  Patient tolerated the procedure well with no immediate complications

## 2025-04-28 NOTE — PROGRESS NOTES
Daniel Vogel presents for follow up evaluation of   Encounter Diagnoses   Name Primary?    Degenerative TFCC tear, right     Primary osteoarthritis of first carpometacarpal joint of right hand     Chronic pain of right wrist     Medial epicondylitis of elbow, left Yes    Cubital tunnel syndrome on left      History of Present Illness    CHIEF COMPLAINT:  - Wrist and elbow pain    HPI:  Patient presents for follow-up of wrist pain. He reports improvement following a previous injection.  He also mentions more pain in his thumb currently. Sharp pains have resolved, but he still experiences some pain depending on movement. He describes ongoing soreness, especially when lifting objects. Specific activities that cause discomfort include using a sheet to move his ill mother.    He denies losing any strength in the affected area or any formal medical diagnoses.    PREVIOUS TREATMENTS:  - Corticosteroid injection in the wrist: Previous visit, provided significant benefit. Sharp wrist pains have resolved since the injection.      Patient is here to follow up for left elbow EMG results, which were normal.    Vitals:    04/28/25 1307   PainSc: Comment: Left elbow: Pain 6/10 soreness medial elbow, intermittent / Right wrist 1/10       PE:    AA&O x 4.  NAD  HEENT:  NCAT, sclera nonicteric  Lungs:  Respirations are equal and unlabored.  CV:  2+ bilateral upper and lower extremity pulses.  MSK: RUE: right wrist decreased ROM flexion and extension, + swelling ulnar wrist, +fovea sign, + ulnar impaction test, + press test, negative ECU subluxation, Positive grind test with telescoping pain right basilar thumb joint, 5/5 thenar and intrinsic musculature strength,  otherwise full range of motion hands, wrists and elbows.     LUE: + Tinel's at elbow, + elbow flexion test, + subluxation, +TTP medial elbow epicondyle, . Neurovascularly intact. 5/5 thenar and intrinsic musculature strength,  otherwise full range of motion hands,  wrists and elbows.    Diagnostic studies and other clinical records review:  2/21/25 MRI right wrist 1. Degenerative TFCC tear. 2. SCL partial tear 3. DRUJ effusion 4. Basilar thumb OA    Assessment/Plan:   Encounter Diagnoses   Name Primary?    Degenerative TFCC tear, right     Primary osteoarthritis of first carpometacarpal joint of right hand     Chronic pain of right wrist     Medial epicondylitis of elbow, left Yes    Cubital tunnel syndrome on left      The patient and I had a thorough discussion today. We discussed the working diagnosis as well as several other potential alternative diagnoses. Treatment options were discussed, both conservative and surgical. Conservative treatment options would include things such as activity modifications, workplace modifications, a period of rest, oral vs topical OTC and prescription anti-inflammatory medications, occupational therapy, splinting/bracing, immobilization, corticosteroid injections, and others. Surgical options were discussed as well.     -I have offered him a selective injection. I have explained the risks, benefits, and alternatives of the procedure in detail.  The patient voices understanding and all questions have been answered. The patient agrees to proceed as planned. So after a sterile prep of the skin in the normal fashion the left medial epicondyle injected using a 25 gauge needle with a combination of 1cc 1% plain lidocaine and 40 mg of methylprednisolone.  The patient is cautioned and immediate relief of pain is secondary to the local anesthetic and will be temporary.  After the anesthetic wears off there may be a increase in pain that may last for a few hours or a few days and they should use ice to help alleviate this flair up of pain. Patient tolerated the procedure well.    Will call in 2 weeks to follow up for steroid injection efficacy or sooner for any worsening of symptoms  Call with any questions/concerns in the interim                   Francheska Pacheco MD    Please be aware that this note has been generated with the assistance of [employer] voice-to-text.  Please excuse any spelling or grammatical errors.  This note was generated with the assistance of ambient listening technology. Verbal consent was obtained by the patient and accompanying visitor(s) for the recording of patient appointment to facilitate this note. I attest to having reviewed and edited the generated note for accuracy, though some syntax or spelling errors may persist. Please contact the author of this note for any clarification.

## 2025-05-01 RX ORDER — METHYLPREDNISOLONE ACETATE 40 MG/ML
40 INJECTION, SUSPENSION INTRA-ARTICULAR; INTRALESIONAL; INTRAMUSCULAR; SOFT TISSUE
Status: DISCONTINUED | OUTPATIENT
Start: 2025-04-28 | End: 2025-05-01 | Stop reason: HOSPADM

## 2025-05-03 DIAGNOSIS — F41.1 GAD (GENERALIZED ANXIETY DISORDER): ICD-10-CM

## 2025-05-03 DIAGNOSIS — F32.1 CURRENT MODERATE EPISODE OF MAJOR DEPRESSIVE DISORDER WITHOUT PRIOR EPISODE: ICD-10-CM

## 2025-05-03 NOTE — TELEPHONE ENCOUNTER
No care due was identified.  [facility] Embedded Care Due Messages. Reference number: [id number].   5/03/2025 9:32:08 AM CDT   Klisyri Pregnancy And Lactation Text: It is unknown if this medication can harm a developing fetus or if it is excreted in breast milk.

## 2025-05-04 NOTE — TELEPHONE ENCOUNTER
Refill Routing Note   Medication(s) are not appropriate for processing by [facility] for the following reason(s):        New or recently adjusted medication    [facility] action(s):  Defer             Appointments  past 12m or future 3m with PCP    Date Provider   Last Visit   4/11/2025 Jorge Urrutia MD   Next Visit   Visit date not found Jorge Urrutia MD   ED visits in past 90 days: 0        Note composed:12:03 PM 05/04/2025

## 2025-05-05 RX ORDER — ESCITALOPRAM OXALATE 10 MG/1
10 TABLET ORAL DAILY
Qty: 90 TABLET | Refills: 3 | Status: SHIPPED | OUTPATIENT
Start: 2025-05-05

## 2025-06-01 DIAGNOSIS — E78.5 DYSLIPIDEMIA: ICD-10-CM

## 2025-06-01 DIAGNOSIS — M15.0 PRIMARY OSTEOARTHRITIS INVOLVING MULTIPLE JOINTS: ICD-10-CM

## 2025-06-01 RX ORDER — ROSUVASTATIN CALCIUM 20 MG/1
20 TABLET, COATED ORAL
Qty: 90 TABLET | Refills: 1 | Status: SHIPPED | OUTPATIENT
Start: 2025-06-01

## 2025-06-02 RX ORDER — CELECOXIB 200 MG/1
200 CAPSULE ORAL
Qty: 90 CAPSULE | Refills: 3 | Status: SHIPPED | OUTPATIENT
Start: 2025-06-02

## 2025-07-25 ENCOUNTER — TELEPHONE (OUTPATIENT)
Dept: FAMILY MEDICINE | Facility: CLINIC | Age: 60
End: 2025-07-25
Payer: COMMERCIAL

## 2025-07-25 DIAGNOSIS — G47.33 OSA ON CPAP: ICD-10-CM

## 2025-07-25 DIAGNOSIS — Z12.5 SCREENING FOR PROSTATE CANCER: ICD-10-CM

## 2025-07-25 DIAGNOSIS — E78.5 DYSLIPIDEMIA: Primary | ICD-10-CM

## 2025-07-25 NOTE — TELEPHONE ENCOUNTER
Pt was seen 4/2025 and stated he did not have any blood work done. Last blood work was done in 10/2024.  Pt wants to know if he needs any blood work done now or if he can wait until he is due for his next 6 month check up. Which will be in October.     Pt was notified that DR Patel is out of office until Tiuesday, and would not hear back from us until next week. Pt verbalized understanding.       Please advise.

## 2025-07-25 NOTE — TELEPHONE ENCOUNTER
----- Message from [facility] sent at 2025  3:53 PM CDT -----  Contact: patient  Daniel Vogel  MRN: [id number]  : 1965  PCP: Jorge Urrutia  Home Phone      Not on file.  Work Phone      Not on file.  Mobile          [phone]      MESSAGE:   Patient needs annual appointment and lab work done. Last lab appointment was 10/07/2024 and he is not sure where he lost track of his follow ups. But is requesting sooner than  if possible. If not he will take .     Please advise: [phone]

## 2025-07-28 NOTE — TELEPHONE ENCOUNTER
Diagnoses and all orders for this visit:    Dyslipidemia  -     Comprehensive Metabolic Panel; Future  -     CBC Auto Differential; Future  -     Lipid Panel; Future    JOY on CPAP  -     CBC Auto Differential; Future    Screening for prostate cancer  -     PSA, Screening; Future      Get blood work 2 weeks before next week

## 2025-07-29 ENCOUNTER — PATIENT MESSAGE (OUTPATIENT)
Dept: INTERNAL MEDICINE | Facility: CLINIC | Age: 60
End: 2025-07-29
Payer: COMMERCIAL